# Patient Record
Sex: FEMALE | Race: WHITE | NOT HISPANIC OR LATINO | URBAN - METROPOLITAN AREA
[De-identification: names, ages, dates, MRNs, and addresses within clinical notes are randomized per-mention and may not be internally consistent; named-entity substitution may affect disease eponyms.]

---

## 2021-12-30 LAB
EXTERNAL CHLAMYDIA RESULT: NOT DETECTED
N GONORRHOEA RRNA SPEC QL PROBE: NOT DETECTED

## 2023-01-04 ENCOUNTER — APPOINTMENT (OUTPATIENT)
Dept: LAB | Facility: MEDICAL CENTER | Age: 33
End: 2023-01-04

## 2023-03-08 ENCOUNTER — TELEPHONE (OUTPATIENT)
Dept: ADMINISTRATIVE | Facility: OTHER | Age: 33
End: 2023-03-08

## 2023-03-08 RX ORDER — NORETHINDRONE ACETATE AND ETHINYL ESTRADIOL 1MG-20(21)
1 KIT ORAL DAILY
COMMUNITY
End: 2023-03-09

## 2023-03-08 NOTE — TELEPHONE ENCOUNTER
----- Message from [facility] sent at 3/8/2023  9:55 AM EST -----  Regarding: pap  11/19/20 9:19 AM    Hello, our patient attached above has had Pap Smear (HPV) aka Cervical Cancer Screening completed/performed  Please assist in updating the patient chart by pulling the Care Everywhere (CE) document  The date of service is 12/30/2021       Thank you,  Sherry Suarez  [facility] [id number] [place] [address]

## 2023-03-09 ENCOUNTER — OFFICE VISIT (OUTPATIENT)
Dept: FAMILY MEDICINE CLINIC | Facility: CLINIC | Age: 33
End: 2023-03-09

## 2023-03-09 VITALS
RESPIRATION RATE: 16 BRPM | HEART RATE: 89 BPM | HEIGHT: 64 IN | OXYGEN SATURATION: 98 % | DIASTOLIC BLOOD PRESSURE: 70 MMHG | TEMPERATURE: 98.3 F | SYSTOLIC BLOOD PRESSURE: 100 MMHG | WEIGHT: 129 LBS | BODY MASS INDEX: 22.02 KG/M2

## 2023-03-09 DIAGNOSIS — Z86.2 HISTORY OF NEUTROPENIA: ICD-10-CM

## 2023-03-09 DIAGNOSIS — Z13.6 SCREENING FOR CARDIOVASCULAR CONDITION: ICD-10-CM

## 2023-03-09 DIAGNOSIS — Z00.00 ENCOUNTER FOR ANNUAL PHYSICAL EXAM: Primary | ICD-10-CM

## 2023-03-09 NOTE — PROGRESS NOTES
[address]    NAME: Nat Barnhart  AGE: 28 y o  SEX: female  : 1990     DATE: 3/9/2023     Assessment and Plan:     Problem List Items Addressed This Visit    None  Visit Diagnoses     Encounter for annual physical exam    -  Primary    Healthy F here as new patient  No acute concerns  UTD for pap  Tdap recieved  Will upload record through [employer] and consumes healthy diet  FBW ordered    Screening for cardiovascular condition        FBW ordered     Relevant Orders    Comprehensive metabolic panel    Lipid panel    History of neutropenia        Reports low white counts in the past and has not seen hematology  Will check and depending on the results, refer to heme     Relevant Orders    CBC and differential          Immunizations and preventive care screenings were discussed with patient today  Appropriate education was printed on patient's after visit summary  Counseling:  Dental Health: discussed importance of regular tooth brushing, flossing, and dental visits  Exercise: the importance of regular exercise/physical activity was discussed  Recommend exercise 3-5 times per week for at least 30 minutes  No follow-ups on file  Chief Complaint:     Chief Complaint   Patient presents with   • Annual Exam     No concerns      History of Present Illness:     Adult Annual Physical   Patient here as a new patient for a comprehensive physical exam  The patient reports no problems  Last PCP Dr Corrine Buenrostro in [place]  Moved back to [place] a few months ago and works per [employer] for [employer]  Last pap smear was in  and again last week with an [facility]  UTD for Tdap    Diet and Physical Activity  Diet/Nutrition: well balanced diet  Exercise: weight training and cardio         Depression Screening  PHQ-2/9 Depression Screening    Little interest or pleasure in doing things: 0 - not at all  Feeling down, depressed, or hopeless: 0 - not at all  PHQ-2 Score: 0  PHQ-2 Interpretation: Negative depression screen       General Health  Sleep: gets 7-8 hours of sleep on average  Hearing: normal - bilateral   Vision: no vision problems  Dental: last appt was Dec 2021 and has [facility] appt   /GYN Health  Last menstrual period: 2/25  Contraceptive method: was on OCP   History of STDs?: no      Review of Systems:     Review of Systems   Past Medical History:     History reviewed  No pertinent past medical history  Past Surgical History:     History reviewed  No pertinent surgical history  Social History:     Social History     Socioeconomic History   • Marital status: /Civil Union     Spouse name: Le Smith   • Number of children: 0   • Years of education: None   • Highest education level: Doctorate   Occupational History   • Occupation: DPT for [employer]   Tobacco Use   • Smoking status: Never     Passive exposure: Never   • Smokeless tobacco: Never   Vaping Use   • Vaping Use: Never used   Substance and Sexual Activity   • Alcohol use:  Yes     Alcohol/week: 5 0 standard drinks     Types: 3 Glasses of wine, 2 Cans of beer per week     Comment: socially   • Drug use: Never   • Sexual activity: Yes     Partners: Male     Birth control/protection: Condom Male   Other Topics Concern   • None   Social History Narrative   • None     Social Determinants of Health     Financial Resource Strain: Not on file   Food Insecurity: Not on file   Transportation Needs: Not on file   Physical Activity: Not on file   Stress: Not on file   Social Connections: Not on file   Intimate Partner Violence: Not on file   Housing Stability: Not on file      Family History:     Family History   Problem Relation Age of Onset   • Thyroid disease Mother         hypothyroidism   • Anxiety disorder Father    • No Known Problems Brother    • No Known Problems Brother    • Dementia Maternal Grandmother    • Heart disease Maternal Grandfather    • Diabetes Maternal Grandfather       Current Medications:     No current outpatient medications on file  No current facility-administered medications for this visit  Allergies:     No Known Allergies   Physical Exam:     /70 (BP Location: Left arm, Patient Position: Sitting, Cuff Size: Standard)   Pulse 89   Temp 98 3 °F (36 8 °C) (Tympanic)   Resp 16   Ht 5' 4" (1 626 m)   Wt 58 5 kg (129 lb)   SpO2 98%   BMI 22 14 kg/m²     Physical Exam  Vitals reviewed  Constitutional:       General: She is not in acute distress  Appearance: Normal appearance  She is not toxic-appearing  HENT:      Head: Normocephalic and atraumatic  Right Ear: Tympanic membrane normal       Left Ear: Tympanic membrane normal       Nose: No congestion  Mouth/Throat:      Mouth: Mucous membranes are moist    Eyes:      Extraocular Movements: Extraocular movements intact  Cardiovascular:      Rate and Rhythm: Normal rate and regular rhythm  Heart sounds: No murmur heard  Pulmonary:      Effort: Pulmonary effort is normal  No respiratory distress  Breath sounds: Normal breath sounds  No stridor  No wheezing or rales  Musculoskeletal:      Right lower leg: No edema  Left lower leg: No edema  Lymphadenopathy:      Cervical: No cervical adenopathy  Skin:     General: Skin is warm  Neurological:      Mental Status: She is alert and oriented to person, place, and time     Psychiatric:         Mood and Affect: Mood normal          Behavior: Behavior normal           Nsesa Peter MD   [address] [facility]

## 2023-03-10 ENCOUNTER — TELEPHONE (OUTPATIENT)
Dept: ADMINISTRATIVE | Facility: OTHER | Age: 33
End: 2023-03-10

## 2023-03-10 NOTE — TELEPHONE ENCOUNTER
Upon review of the In Basket request we were able to locate, review, and update the patient chart as requested for Pap Smear (HPV) aka Cervical Cancer Screening  Any additional questions or concerns should be emailed to the Practice Liaisons via the appropriate education email address, please do not reply via In Basket      Thank you  Gabriela Bob MA

## 2023-03-10 NOTE — TELEPHONE ENCOUNTER
----- Message from Héctor Morgan MD sent at 3/9/2023  5:27 PM EST -----  Hello,    Patient had a pap smear with [facility] last week  Could we please update the care gap       Thank you

## 2023-03-12 NOTE — TELEPHONE ENCOUNTER
Upon review of the In Basket request we were able to locate, review, and update the patient chart as requested for Pap Smear (HPV) aka Cervical Cancer Screening  [id number], and 2019  Any additional questions or concerns should be emailed to the Practice Liaisons via the appropriate education email address, please do not reply via In Basket      Thank you  Sita Bryson

## 2023-03-22 LAB
ALBUMIN SERPL-MCNC: 4.5 G/DL (ref 3.6–5.1)
ALBUMIN/GLOB SERPL: 1.8 (CALC) (ref 1–2.5)
ALP SERPL-CCNC: 30 U/L (ref 31–125)
ALT SERPL-CCNC: 11 U/L (ref 6–29)
AST SERPL-CCNC: 17 U/L (ref 10–30)
BASOPHILS # BLD AUTO: 28 CELLS/UL (ref 0–200)
BASOPHILS NFR BLD AUTO: 0.7 %
BILIRUB SERPL-MCNC: 0.6 MG/DL (ref 0.2–1.2)
BUN SERPL-MCNC: 15 MG/DL (ref 7–25)
BUN/CREAT SERPL: ABNORMAL (CALC) (ref 6–22)
CALCIUM SERPL-MCNC: 9.1 MG/DL (ref 8.6–10.2)
CHLORIDE SERPL-SCNC: 105 MMOL/L (ref 98–110)
CHOLEST SERPL-MCNC: 174 MG/DL
CHOLEST/HDLC SERPL: 2.7 (CALC)
CO2 SERPL-SCNC: 26 MMOL/L (ref 20–32)
CREAT SERPL-MCNC: 0.7 MG/DL (ref 0.5–0.97)
EOSINOPHIL # BLD AUTO: 120 CELLS/UL (ref 15–500)
EOSINOPHIL NFR BLD AUTO: 3 %
ERYTHROCYTE [DISTWIDTH] IN BLOOD BY AUTOMATED COUNT: 11.9 % (ref 11–15)
GFR/BSA.PRED SERPLBLD CYS-BASED-ARV: 118 ML/MIN/1.73M2
GLOBULIN SER CALC-MCNC: 2.5 G/DL (CALC) (ref 1.9–3.7)
GLUCOSE SERPL-MCNC: 90 MG/DL (ref 65–99)
HCT VFR BLD AUTO: 38.4 % (ref 35–45)
HDLC SERPL-MCNC: 64 MG/DL
HGB BLD-MCNC: 12.9 G/DL (ref 11.7–15.5)
LDLC SERPL CALC-MCNC: 93 MG/DL (CALC)
LYMPHOCYTES # BLD AUTO: 1652 CELLS/UL (ref 850–3900)
LYMPHOCYTES NFR BLD AUTO: 41.3 %
MCH RBC QN AUTO: 30.4 PG (ref 27–33)
MCHC RBC AUTO-ENTMCNC: 33.6 G/DL (ref 32–36)
MCV RBC AUTO: 90.4 FL (ref 80–100)
MONOCYTES # BLD AUTO: 256 CELLS/UL (ref 200–950)
MONOCYTES NFR BLD AUTO: 6.4 %
NEUTROPHILS # BLD AUTO: 1944 CELLS/UL (ref 1500–7800)
NEUTROPHILS NFR BLD AUTO: 48.6 %
NONHDLC SERPL-MCNC: 110 MG/DL (CALC)
PLATELET # BLD AUTO: 240 THOUSAND/UL (ref 140–400)
PMV BLD REES-ECKER: 11.3 FL (ref 7.5–12.5)
POTASSIUM SERPL-SCNC: 4.5 MMOL/L (ref 3.5–5.3)
PROT SERPL-MCNC: 7 G/DL (ref 6.1–8.1)
RBC # BLD AUTO: 4.25 MILLION/UL (ref 3.8–5.1)
SODIUM SERPL-SCNC: 137 MMOL/L (ref 135–146)
TRIGL SERPL-MCNC: 79 MG/DL
WBC # BLD AUTO: 4 THOUSAND/UL (ref 3.8–10.8)

## 2023-12-17 DIAGNOSIS — Z00.6 ENCOUNTER FOR EXAMINATION FOR NORMAL COMPARISON OR CONTROL IN CLINICAL RESEARCH PROGRAM: ICD-10-CM

## 2024-02-01 ENCOUNTER — TELEPHONE (OUTPATIENT)
Dept: OBGYN CLINIC | Facility: CLINIC | Age: 34
End: 2024-02-01

## 2024-02-01 NOTE — TELEPHONE ENCOUNTER
Attempted to call, left a message asking for patient to call the office. Office number provided in message. Patient has an annual scheduled on 02/26 - patient had an annual in march of 2023 with [facility].     When patient calls back, please inquire if she would like to be seen for something else, or if she would like to reschedule her annual

## 2024-02-06 ENCOUNTER — OFFICE VISIT (OUTPATIENT)
Dept: OBGYN CLINIC | Facility: CLINIC | Age: 34
End: 2024-02-06

## 2024-02-06 VITALS
RESPIRATION RATE: 18 BRPM | DIASTOLIC BLOOD PRESSURE: 88 MMHG | SYSTOLIC BLOOD PRESSURE: 124 MMHG | HEART RATE: 91 BPM | WEIGHT: 130 LBS | HEIGHT: 64 IN | BODY MASS INDEX: 22.2 KG/M2

## 2024-02-06 DIAGNOSIS — Z01.419 ENCOUNTER FOR GYNECOLOGICAL EXAMINATION WITHOUT ABNORMAL FINDING: Primary | ICD-10-CM

## 2024-02-06 LAB
EXTERNAL CHLAMYDIA RESULT: NOT DETECTED
EXTERNAL HIV SCREEN: NORMAL
HCV AB SER-ACNC: NORMAL
N GONORRHOEA RRNA SPEC QL PROBE: NOT DETECTED

## 2024-02-06 PROCEDURE — S0610 ANNUAL GYNECOLOGICAL EXAMINA: HCPCS | Performed by: NURSE PRACTITIONER

## 2024-02-06 NOTE — PROGRESS NOTES
ASSESSMENT & PLAN: Calli Blair is a 33 y.o. No obstetric history on file. with normal gynecologic exam.    1.  Routine well woman exam done today  2.  Pap and HPV:  The patient's last pap and hpv was 3/2/23.    It was normal.  She denies abnormal pap hx.   Pap and cotesting was not done today.    Current ASCCP Guidelines reviewed.   3.  The following were reviewed in today's visit: breast self exam, mammography screening ordered, STD testing, adequate intake of calcium and vitamin D, exercise, and healthy diet.  4. Gardisil vaccine in women up to age 45 - she was vaccinated  5. Desires a pregnancy, has appt with infertility today.   Taking daily prenatal vitamin     Depression Screening Follow-up Plan: Patient's depression screening was negative  with a PHQ-2 score of 1 . Their PHQ-9 score was 2. Clinically patient does not have depression. No treatment is required.  BMI Counseling: Body mass index is 22.31 kg/m². The BMI is normal .     CC:  Annual Gynecologic Examination    HPI: Calli Blair is a 33 y.o. No obstetric history on file. who presents for annual gynecologic examination.  Has infertility appt this afternoon, has been attempting pregnancy x 4 months. Using ovulation tests that have been positive.  Gets PMS symptoms. Seeing a therapist for anxiety.  Health Maintenance:    She wears her seatbelt routinely.    She does perform regular monthly self breast exams.    She feels safe at home.     History reviewed. No pertinent past medical history.    History reviewed. No pertinent surgical history.    Past OB/Gyn History:  OB History    No obstetric history on file.       Pt does not have menstrual issues. Monthly x 5 days   History of sexually transmitted infection: No. Heavy 1st day, changes every 4 hours when heavy  History of abnormal pap smears: No .    Patient is currently sexually active.  heterosexual.  The current method of family planning is none.    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[employer]   Tobacco Use    Smoking status: Never     Passive exposure: Never    Smokeless tobacco: Never   Vaping Use    Vaping status: Never Used   Substance and Sexual Activity    Alcohol use: Yes     Alcohol/week: 4.0 standard drinks of alcohol     Types: 2 Glasses of wine, 2 Cans of beer per week     Comment: socially    Drug use: Never    Sexual activity: Yes     Partners: Male     Birth control/protection: None   Other Topics Concern    Not on file   Social History Narrative    Not on file     Social Determinants of Health     Financial Resource Strain: Low Risk  (2/5/2024)    Overall Financial Resource Strain (CARDIA)     Difficulty of Paying Living Expenses: Not hard at all   Food Insecurity: No Food Insecurity (2/5/2024)    Hunger Vital Sign     Worried About Running Out of Food in the Last Year: Never true     Ran Out of Food in the Last Year: Never true   Transportation Needs: No Transportation Needs (2/5/2024)    PRAPARE - Transportation     Lack of Transportation (Medical): No     Lack of Transportation (Non-Medical): No   Physical Activity: Not on file   Stress: Not on file   Social Connections: Not on file   Intimate Partner Violence: Not on file   Housing Stability: Low Risk  (2/6/2024)    Housing Stability Vital Sign     Unable to Pay for Housing in the Last Year: No     Number of Places Lived in the Last Year: 1     Unstable Housing in the Last Year: No     Presently lives with .   "Patient is .  Patient is currently employed  Physical therapist at [employer]    No Known Allergies    No current outpatient medications on file.      Review of Systems  Constitutional :no fever, feels well, no tiredness, no recent weight gain or loss  ENT: no ear ache, no loss of hearing, no nosebleeds or nasal discharge, no sore throat or hoarseness.  Cardiovascular: no complaints of slow or fast heart beat, no chest pain, no palpitations, no leg claudication or lower extremity edema.  Respiratory: no complaints of shortness of shortness of breath, no WESLEY  Breasts:no complaints of breast pain, breast lump, or nipple discharge  Gastrointestinal: no complaints of abdominal pain, constipation, nausea, vomiting, or diarrhea or bloody stools  Genitourinary : no complaints of dysuria, incontinence, pelvic pain, no dysmenorrhea, vaginal discharge or abnormal vaginal bleeding and as noted in HPI.  Musculoskeletal: no complaints of arthralgia, no myalgia, no joint swelling or stiffness, no limb pain or swelling.  Integumentary: no complaints of skin rash or lesion, itching or dry skin  Neurological: no complaints of headache, no confusion, no numbness or tingling, no dizziness or fainting    Objective      /88 (BP Location: Right arm, Patient Position: Sitting, Cuff Size: Adult)   Pulse 91   Resp 18   Ht 5' 4\" (1.626 m)   Wt 59 kg (130 lb)   LMP 01/18/2024 (Exact Date)   BMI 22.31 kg/m²   General:   appears stated age, cooperative, alert normal mood and affect   Neck: normal, supple,trachea midline, no masses   Heart: regular rate and rhythm, S1, S2 normal, no murmur, click, rub or gallop   Lungs: clear to auscultation bilaterally   Breasts: normal appearance, no masses or tenderness, Inspection negative, No nipple retraction or dimpling, No nipple discharge or bleeding, No axillary or supraclavicular adenopathy, Normal to palpation without dominant masses, Taught monthly breast self examination   Abdomen: " soft, non-tender, without masses or organomegaly   Vulva: normal female genitalia, Bartholin's, Urethra, Gage normal   Vagina: normal vagina, no discharge, exudate, lesion, or erythema   Urethra: normal   Cervix: Normal, no discharge. Nontender.   Uterus: normal size, contour, position, consistency, mobility, non-tender and mobile   Adnexa: normal adnexa and no mass, fullness, tenderness   Lymphatic palpation of lymph nodes in neck, axilla, groin and/or other locations: no lymphadenopathy or masses noted   Skin normal skin turgor and no rashes.   Psychiatric orientation to person, place, and time: normal. mood and affect: normal

## 2024-02-21 PROBLEM — Z01.419 ENCOUNTER FOR GYNECOLOGICAL EXAMINATION WITHOUT ABNORMAL FINDING: Status: RESOLVED | Noted: 2024-02-06 | Resolved: 2024-02-21

## 2024-03-04 ENCOUNTER — TELEPHONE (OUTPATIENT)
Age: 34
End: 2024-03-04

## 2024-03-04 NOTE — TELEPHONE ENCOUNTER
Received call from patient asking if there were any new patient opening's at [facility].    No new patient appt were available at the time of this call    Patient has already been wait/cancellation listed in Jan.

## 2024-03-15 ENCOUNTER — OFFICE VISIT (OUTPATIENT)
Dept: FAMILY MEDICINE CLINIC | Facility: CLINIC | Age: 34
End: 2024-03-15
Payer: COMMERCIAL

## 2024-03-15 VITALS
WEIGHT: 129.2 LBS | TEMPERATURE: 97.8 F | OXYGEN SATURATION: 99 % | DIASTOLIC BLOOD PRESSURE: 60 MMHG | HEART RATE: 97 BPM | HEIGHT: 64 IN | RESPIRATION RATE: 16 BRPM | SYSTOLIC BLOOD PRESSURE: 110 MMHG | BODY MASS INDEX: 22.06 KG/M2

## 2024-03-15 DIAGNOSIS — F41.1 GAD (GENERALIZED ANXIETY DISORDER): ICD-10-CM

## 2024-03-15 DIAGNOSIS — Z00.00 ENCOUNTER FOR ANNUAL PHYSICAL EXAM: Primary | ICD-10-CM

## 2024-03-15 PROCEDURE — 99395 PREV VISIT EST AGE 18-39: CPT | Performed by: FAMILY MEDICINE

## 2024-03-15 RX ORDER — SERTRALINE HYDROCHLORIDE 25 MG/1
25 TABLET, FILM COATED ORAL DAILY
Qty: 30 TABLET | Refills: 0 | Status: SHIPPED | OUTPATIENT
Start: 2024-03-15 | End: 2024-09-11

## 2024-03-15 NOTE — PROGRESS NOTES
ADULT ANNUAL PHYSICAL  [facility]    NAME: Calli Blair  AGE: 33 y.o. SEX: female  : 1990     DATE: 3/15/2024     Assessment and Plan:     Problem List Items Addressed This Visit    None  Visit Diagnoses       Encounter for annual physical exam    -  Primary    Unremarkable exam. UTD for pap.    YOSEF (generalized anxiety disorder)        Chronic and dates back to chidlhood. Sees a therapist but has not tried medications. Interested in starting meds. Scored 10 on YOSEF 7. Start zoloft 25 mg daily. May increase to 50 mg in 2 weeks for partial response. Aware of s/e. RTC 6 wks    Relevant Medications    sertraline (ZOLOFT) 25 mg tablet            Immunizations and preventive care screenings were discussed with patient today. Appropriate education was printed on patient's after visit summary.    Counseling:  Exercise: the importance of regular exercise/physical activity was discussed. Recommend exercise 3-5 times per week for at least 30 minutes.          No follow-ups on file.     Chief Complaint:     Chief Complaint   Patient presents with   • Physical Exam      History of Present Illness:     Adult Annual Physical   Patient here for a comprehensive physical exam. The patient reports problems - anxiety. She has dealt with for years. Sees a therapist regularly. Has not been on medication. FH of mental health disease. Exercises regularly, mediates, and does yoga  .    Diet and Physical Activity  Diet/Nutrition: well balanced diet.   Exercise:  exercising regularly  .      Depression Screening  PHQ-2/9 Depression Screening    Little interest or pleasure in doing things: 0 - not at all  Feeling down, depressed, or hopeless: 0 - not at all  PHQ-2 Score: 0  PHQ-2 Interpretation: Negative depression screen       General Health  Sleep: sleeps well.   Hearing: normal - bilateral.  Vision: no vision problems.   Dental: regular dental visits.       /GYN  Health  Follows with gynecology? yes   Last menstrual period: Feb 16th  Contraceptive method:  none .  History of STDs?: no.       Review of Systems:     Review of Systems   Past Medical History:     History reviewed. No pertinent past medical history.   Past Surgical History:     History reviewed. No pertinent surgical history.   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[employer]   Tobacco Use   • Smoking status: Never     Passive exposure: Never   • Smokeless tobacco: Never   Vaping Use   • Vaping status: Never Used   Substance and Sexual Activity   • Alcohol use: Yes     Alcohol/week: 4.0 standard drinks of alcohol     Types: 2 Glasses of wine, 2 Cans of beer per week     Comment: socially   • Drug use: Never   • Sexual activity: Yes     Partners: Male     Birth control/protection: None   Other Topics Concern   • None   Social History Narrative   • None     Social Determinants of Health     Financial Resource Strain: Low Risk  (2/5/2024)    Overall Financial Resource Strain (CARDIA)    • Difficulty of Paying Living Expenses: Not hard at all   Food Insecurity: No Food Insecurity (2/5/2024)    Hunger Vital Sign    • Worried About Running Out of Food in the Last Year: Never true    • Ran Out of Food in the Last Year: Never true   Transportation Needs: No Transportation Needs (2/5/2024)    PRAPARE - Transportation    • Lack of Transportation (Medical): No    • Lack of Transportation (Non-Medical): No   Physical Activity: Not on file   Stress: Not on file   Social Connections: Not on file   Intimate Partner Violence: Not on file   Housing Stability: Low Risk  (2/6/2024)    Housing Stability Vital Sign    • Unable to Pay for Housing in the Last Year: No    • Number of Places Lived in the Last Year: 1  "   • Unstable Housing in the Last Year: No      Family History:     Family History   Problem Relation Age of Onset   • Thyroid disease Mother         hypothyroidism   • Anxiety disorder Father    • No Known Problems Brother    • No Known Problems Brother    • Dementia Maternal Grandmother    • Heart disease Maternal Grandfather    • Diabetes Maternal Grandfather       Current Medications:     Current Outpatient Medications   Medication Sig Dispense Refill   • sertraline (ZOLOFT) 25 mg tablet Take 1 tablet (25 mg total) by mouth daily 30 tablet 0     No current facility-administered medications for this visit.      Allergies:     No Known Allergies   Physical Exam:     /60 (BP Location: Left arm, Patient Position: Sitting, Cuff Size: Adult)   Pulse 97   Temp 97.8 °F (36.6 °C) (Tympanic)   Resp 16   Ht 5' 4\" (1.626 m)   Wt 58.6 kg (129 lb 3.2 oz)   SpO2 99%   BMI 22.18 kg/m²     Physical Exam  Constitutional:       General: She is not in acute distress.     Appearance: Normal appearance. She is not ill-appearing or toxic-appearing.   HENT:      Head: Normocephalic and atraumatic.      Right Ear: Tympanic membrane normal.      Left Ear: Tympanic membrane normal.      Mouth/Throat:      Mouth: Mucous membranes are moist.   Eyes:      Extraocular Movements: Extraocular movements intact.   Cardiovascular:      Rate and Rhythm: Normal rate and regular rhythm.      Heart sounds: No murmur heard.  Pulmonary:      Effort: Pulmonary effort is normal. No respiratory distress.      Breath sounds: Normal breath sounds. No stridor. No wheezing or rales.   Abdominal:      General: There is no distension.      Palpations: Abdomen is soft. There is no mass.      Tenderness: There is no abdominal tenderness. There is no guarding or rebound.      Hernia: No hernia is present.   Musculoskeletal:      Right lower leg: No edema.      Left lower leg: No edema.   Lymphadenopathy:      Cervical: No cervical adenopathy.   Skin:   "   General: Skin is warm.   Neurological:      Mental Status: She is alert and oriented to person, place, and time.   Psychiatric:         Behavior: Behavior normal.        Eric Olmedo MD   [facility], [place], operated [facility]

## 2024-04-06 DIAGNOSIS — F41.1 GAD (GENERALIZED ANXIETY DISORDER): ICD-10-CM

## 2024-04-07 RX ORDER — SERTRALINE HYDROCHLORIDE 25 MG/1
25 TABLET, FILM COATED ORAL DAILY
Qty: 30 TABLET | Refills: 5 | Status: SHIPPED | OUTPATIENT
Start: 2024-04-07 | End: 2024-10-04

## 2024-04-10 ENCOUNTER — PATIENT MESSAGE (OUTPATIENT)
Dept: FAMILY MEDICINE CLINIC | Facility: CLINIC | Age: 34
End: 2024-04-10

## 2024-04-10 DIAGNOSIS — F41.1 GAD (GENERALIZED ANXIETY DISORDER): ICD-10-CM

## 2024-04-29 DIAGNOSIS — F41.1 GAD (GENERALIZED ANXIETY DISORDER): ICD-10-CM

## 2024-04-29 RX ORDER — SERTRALINE HYDROCHLORIDE 25 MG/1
25 TABLET, FILM COATED ORAL DAILY
Qty: 30 TABLET | Refills: 5 | Status: CANCELLED | OUTPATIENT
Start: 2024-04-29 | End: 2024-10-26

## 2024-04-29 NOTE — PATIENT COMMUNICATION
Patient called the RX Refill Line. Message is being forwarded to the office.     Patient is requesting a new script for the adjusted dosage for her sertraline be sent into [facility]. She is low/out of this medication at this time but needs the script to be resent with the adjusted 50mg dosage to be able to pick it up at the pharmacy. Please review and advise.    Please contact patient at  with any questions.

## 2024-04-29 NOTE — TELEPHONE ENCOUNTER
need to refill my prescription, but my next refill from [facility] isn’t available until May 6 (because the script is for 1 tablet of 25mg, but we since upped it to 2 tablets).   Are you able to send updated script to my pharmacy?     [address])  [phone]

## 2024-04-30 NOTE — TELEPHONE ENCOUNTER
Refill must be reviewed and completed by the office or provider. The refill is unable to be approved or denied by the medication management team.    Message below is requesting for directions to be changed from one tablet to 2 tablets and there is no indication of the medication to be changed. Please contact patient to clarify and order if appropriate

## 2024-06-05 ENCOUNTER — PATIENT MESSAGE (OUTPATIENT)
Dept: FAMILY MEDICINE CLINIC | Facility: CLINIC | Age: 34
End: 2024-06-05

## 2024-06-05 DIAGNOSIS — F41.1 GAD (GENERALIZED ANXIETY DISORDER): ICD-10-CM

## 2024-06-05 DIAGNOSIS — Z86.2 HISTORY OF NEUTROPENIA: Primary | ICD-10-CM

## 2024-06-05 DIAGNOSIS — Z00.00 ENCOUNTER FOR SCREENING AND PREVENTATIVE CARE: ICD-10-CM

## 2024-06-12 ENCOUNTER — APPOINTMENT (OUTPATIENT)
Dept: LAB | Facility: CLINIC | Age: 34
End: 2024-06-12
Payer: COMMERCIAL

## 2024-06-12 DIAGNOSIS — Z00.00 ENCOUNTER FOR SCREENING AND PREVENTATIVE CARE: ICD-10-CM

## 2024-06-12 DIAGNOSIS — Z86.2 HISTORY OF NEUTROPENIA: ICD-10-CM

## 2024-06-12 DIAGNOSIS — F41.1 GAD (GENERALIZED ANXIETY DISORDER): ICD-10-CM

## 2024-06-12 DIAGNOSIS — Z00.6 ENCOUNTER FOR EXAMINATION FOR NORMAL COMPARISON OR CONTROL IN CLINICAL RESEARCH PROGRAM: ICD-10-CM

## 2024-06-12 DIAGNOSIS — Z00.8 HEALTH EXAMINATION IN POPULATION SURVEY: ICD-10-CM

## 2024-06-12 LAB
ALBUMIN SERPL BCP-MCNC: 4.3 G/DL (ref 3.5–5)
ALP SERPL-CCNC: 28 U/L (ref 34–104)
ALT SERPL W P-5'-P-CCNC: 12 U/L (ref 7–52)
ANION GAP SERPL CALCULATED.3IONS-SCNC: 9 MMOL/L (ref 4–13)
AST SERPL W P-5'-P-CCNC: 17 U/L (ref 13–39)
BASOPHILS # BLD AUTO: 0.01 THOUSANDS/ÂΜL (ref 0–0.1)
BASOPHILS NFR BLD AUTO: 0 % (ref 0–1)
BILIRUB SERPL-MCNC: 0.36 MG/DL (ref 0.2–1)
BUN SERPL-MCNC: 12 MG/DL (ref 5–25)
CALCIUM SERPL-MCNC: 8.6 MG/DL (ref 8.4–10.2)
CHLORIDE SERPL-SCNC: 105 MMOL/L (ref 96–108)
CHOLEST SERPL-MCNC: 168 MG/DL
CO2 SERPL-SCNC: 27 MMOL/L (ref 21–32)
CREAT SERPL-MCNC: 0.69 MG/DL (ref 0.6–1.3)
EOSINOPHIL # BLD AUTO: 0.19 THOUSAND/ÂΜL (ref 0–0.61)
EOSINOPHIL NFR BLD AUTO: 4 % (ref 0–6)
ERYTHROCYTE [DISTWIDTH] IN BLOOD BY AUTOMATED COUNT: 13.6 % (ref 11.6–15.1)
EST. AVERAGE GLUCOSE BLD GHB EST-MCNC: 105 MG/DL
GFR SERPL CREATININE-BSD FRML MDRD: 114 ML/MIN/1.73SQ M
GLUCOSE P FAST SERPL-MCNC: 94 MG/DL (ref 65–99)
HBA1C MFR BLD: 5.3 %
HCT VFR BLD AUTO: 39.5 % (ref 34.8–46.1)
HDLC SERPL-MCNC: 63 MG/DL
HGB BLD-MCNC: 12.7 G/DL (ref 11.5–15.4)
IMM GRANULOCYTES # BLD AUTO: 0.01 THOUSAND/UL (ref 0–0.2)
IMM GRANULOCYTES NFR BLD AUTO: 0 % (ref 0–2)
LDLC SERPL CALC-MCNC: 82 MG/DL (ref 0–100)
LYMPHOCYTES # BLD AUTO: 1.49 THOUSANDS/ÂΜL (ref 0.6–4.47)
LYMPHOCYTES NFR BLD AUTO: 35 % (ref 14–44)
MCH RBC QN AUTO: 30.7 PG (ref 26.8–34.3)
MCHC RBC AUTO-ENTMCNC: 32.2 G/DL (ref 31.4–37.4)
MCV RBC AUTO: 95 FL (ref 82–98)
MONOCYTES # BLD AUTO: 0.34 THOUSAND/ÂΜL (ref 0.17–1.22)
MONOCYTES NFR BLD AUTO: 8 % (ref 4–12)
NEUTROPHILS # BLD AUTO: 2.26 THOUSANDS/ÂΜL (ref 1.85–7.62)
NEUTS SEG NFR BLD AUTO: 53 % (ref 43–75)
NONHDLC SERPL-MCNC: 105 MG/DL
NRBC BLD AUTO-RTO: 0 /100 WBCS
PLATELET # BLD AUTO: 210 THOUSANDS/UL (ref 149–390)
PMV BLD AUTO: 11.8 FL (ref 8.9–12.7)
POTASSIUM SERPL-SCNC: 4 MMOL/L (ref 3.5–5.3)
PROT SERPL-MCNC: 6.6 G/DL (ref 6.4–8.4)
RBC # BLD AUTO: 4.14 MILLION/UL (ref 3.81–5.12)
SODIUM SERPL-SCNC: 141 MMOL/L (ref 135–147)
TRIGL SERPL-MCNC: 115 MG/DL
WBC # BLD AUTO: 4.3 THOUSAND/UL (ref 4.31–10.16)

## 2024-06-12 PROCEDURE — 83036 HEMOGLOBIN GLYCOSYLATED A1C: CPT

## 2024-06-12 PROCEDURE — 80061 LIPID PANEL: CPT

## 2024-06-12 PROCEDURE — 36415 COLL VENOUS BLD VENIPUNCTURE: CPT

## 2024-06-12 PROCEDURE — 85025 COMPLETE CBC W/AUTO DIFF WBC: CPT

## 2024-06-12 PROCEDURE — 80053 COMPREHEN METABOLIC PANEL: CPT

## 2024-09-11 ENCOUNTER — ULTRASOUND (OUTPATIENT)
Dept: OBGYN CLINIC | Facility: CLINIC | Age: 34
End: 2024-09-11
Payer: COMMERCIAL

## 2024-09-11 VITALS
DIASTOLIC BLOOD PRESSURE: 80 MMHG | WEIGHT: 125 LBS | HEIGHT: 64 IN | SYSTOLIC BLOOD PRESSURE: 102 MMHG | BODY MASS INDEX: 21.34 KG/M2

## 2024-09-11 DIAGNOSIS — N91.2 AMENORRHEA: Primary | ICD-10-CM

## 2024-09-11 PROCEDURE — 76817 TRANSVAGINAL US OBSTETRIC: CPT | Performed by: PHYSICIAN ASSISTANT

## 2024-09-11 PROCEDURE — 99214 OFFICE O/P EST MOD 30 MIN: CPT | Performed by: PHYSICIAN ASSISTANT

## 2024-09-11 RX ORDER — PROGESTERONE 200 MG/1
CAPSULE ORAL
COMMUNITY
Start: 2024-08-30

## 2024-09-11 RX ORDER — LEVOTHYROXINE SODIUM 75 UG/1
TABLET ORAL
COMMUNITY
Start: 2024-08-12

## 2024-09-11 RX ORDER — GLUCOSAMINE HCL 500 MG
TABLET ORAL
COMMUNITY

## 2024-09-11 NOTE — PROGRESS NOTES
"Assessment/Plan:  - Viable IUP @ 8w 5d EGA  - MIGUEL 2025  - Continue PNV  - Patient to call for concerns  - RTO 3 weeks for OB intake    Encounter Diagnosis     ICD-10-CM    1. Amenorrhea  N91.2 Ambulatory Referral to Maternal Fetal Medicine              Subjective:       Patient ID: Calli Blair 1990        Calli Blair is a 34 y.o.  presenting to the office for pregnancy confirmation. Patient's last menstrual period was 2024 (exact date).      This is an IVF pregnancy with FET completed on 24 with an EDC as 25.      OB History    Para Term  AB Living   1             SAB IAB Ectopic Multiple Live Births                  # Outcome Date GA Lbr Jon/2nd Weight Sex Type Anes PTL Lv   1 Current               Obstetric Comments   Menarche 12          The following portions of the patient's history were reviewed and updated as appropriate: allergies, current medications, past family history, past medical history, past social history, past surgical history, and problem list.    Allergies:  Patient has no known allergies.    Medications:    Current Outpatient Medications:     Cholecalciferol (Vitamin D3) 75 MCG (3000 UT) TABS, Take by mouth, Disp: , Rfl:     levothyroxine 75 mcg tablet, , Disp: , Rfl:     Prenatal MV-Min-Fe Fum-FA-DHA (PRENATAL 1 PO), Take by mouth, Disp: , Rfl:     Progesterone 200 MG CAPS, , Disp: , Rfl:     sertraline (ZOLOFT) 50 mg tablet, Take 1 tablet (50 mg total) by mouth daily, Disp: 90 tablet, Rfl: 1      Review of Systems:   Review of Systems   Gastrointestinal:  Negative for abdominal pain, nausea and vomiting.   Genitourinary:  Negative for vaginal bleeding.   Skin:  Negative for rash.          Objective:       Visit Vitals  /80 (BP Location: Left arm, Patient Position: Sitting, Cuff Size: Standard)   Ht 5' 4\" (1.626 m)   Wt 56.7 kg (125 lb)   LMP 2024 (Exact Date)   BMI 21.46 kg/m²   OB Status Pregnant   Smoking Status Never   BSA 1.6 " m²        GEN: The patient was alert and oriented x3, pleasant well-appearing female in no acute distress.   PULM: nonlabored respirations  MSK: Normal gait  : WNl  Skin: warm, dry  Neuro: no focal deficits  Psych: normal affect and judgement, cooperative    Ultrasound:     Viability US     Gestational sac: present               Location: intrauterine  Yolk sac: present  Fetal pole: present               CRL: 2.12 cm = 8w5d  Cardiac activity: present               Rate: 175 bpm     Ovaries: normal appearing bilaterally  Cul de sac: absence of free fluid  Uterus: normal in appearance           Ultrasound Probe Disinfection    A transvaginal ultrasound was performed.   Prior to use, disinfection was performed with High Level Disinfection Process (Trophon)  Probe serial number [id number]: [id number] was used    Miri Ty PA-C  09/11/24  8:55 AM    I have spent a total time of 30 minutes in caring for this patient on the day of the visit/encounter including Documenting in the medical record, Reviewing / ordering tests, medicine, procedures  , and Obtaining or reviewing history  .

## 2024-09-24 NOTE — PATIENT INSTRUCTIONS
Congratulations!! Please review our Pregnancy Essential Guide and [facility] L&D Virtual tour from our networks website.     [facility] Pregnancy Essentials Guide  [facility] ([email or web address])     [facility] ([email or web address])           Lesly STOVALL RN  OB Nurse Navigator

## 2024-09-27 ENCOUNTER — APPOINTMENT (OUTPATIENT)
Dept: LAB | Facility: AMBULARY SURGERY CENTER | Age: 34
End: 2024-09-27
Payer: COMMERCIAL

## 2024-09-27 ENCOUNTER — INITIAL PRENATAL (OUTPATIENT)
Dept: OBGYN CLINIC | Facility: CLINIC | Age: 34
End: 2024-09-27

## 2024-09-27 VITALS — WEIGHT: 124.8 LBS | HEIGHT: 64 IN | HEART RATE: 88 BPM | BODY MASS INDEX: 21.31 KG/M2

## 2024-09-27 DIAGNOSIS — Z3A.11 11 WEEKS GESTATION OF PREGNANCY: ICD-10-CM

## 2024-09-27 DIAGNOSIS — F41.1 GAD (GENERALIZED ANXIETY DISORDER): ICD-10-CM

## 2024-09-27 DIAGNOSIS — Z34.01 ENCOUNTER FOR SUPERVISION OF NORMAL FIRST PREGNANCY IN FIRST TRIMESTER: ICD-10-CM

## 2024-09-27 DIAGNOSIS — Z34.01 ENCOUNTER FOR SUPERVISION OF NORMAL FIRST PREGNANCY IN FIRST TRIMESTER: Primary | ICD-10-CM

## 2024-09-27 LAB
ABO GROUP BLD: NORMAL
BACTERIA UR QL AUTO: ABNORMAL /HPF
BASOPHILS # BLD AUTO: 0.01 THOUSANDS/ΜL (ref 0–0.1)
BASOPHILS NFR BLD AUTO: 0 % (ref 0–1)
BILIRUB UR QL STRIP: NEGATIVE
BLD GP AB SCN SERPL QL: NEGATIVE
CAOX CRY URNS QL MICRO: ABNORMAL /HPF
CLARITY UR: CLEAR
COLOR UR: YELLOW
EOSINOPHIL # BLD AUTO: 0.18 THOUSAND/ΜL (ref 0–0.61)
EOSINOPHIL NFR BLD AUTO: 3 % (ref 0–6)
ERYTHROCYTE [DISTWIDTH] IN BLOOD BY AUTOMATED COUNT: 13.5 % (ref 11.6–15.1)
GLUCOSE UR STRIP-MCNC: NEGATIVE MG/DL
HBV SURFACE AG SER QL: NORMAL
HCT VFR BLD AUTO: 39.2 % (ref 34.8–46.1)
HCV AB SER QL: NORMAL
HGB BLD-MCNC: 12.6 G/DL (ref 11.5–15.4)
HGB UR QL STRIP.AUTO: NEGATIVE
HIV 1+2 AB+HIV1 P24 AG SERPL QL IA: NORMAL
HIV 2 AB SERPL QL IA: NORMAL
HIV1 AB SERPL QL IA: NORMAL
HIV1 P24 AG SERPL QL IA: NORMAL
IMM GRANULOCYTES # BLD AUTO: 0.02 THOUSAND/UL (ref 0–0.2)
IMM GRANULOCYTES NFR BLD AUTO: 0 % (ref 0–2)
KETONES UR STRIP-MCNC: NEGATIVE MG/DL
LEUKOCYTE ESTERASE UR QL STRIP: NEGATIVE
LYMPHOCYTES # BLD AUTO: 1.88 THOUSANDS/ΜL (ref 0.6–4.47)
LYMPHOCYTES NFR BLD AUTO: 34 % (ref 14–44)
MCH RBC QN AUTO: 30 PG (ref 26.8–34.3)
MCHC RBC AUTO-ENTMCNC: 32.1 G/DL (ref 31.4–37.4)
MCV RBC AUTO: 93 FL (ref 82–98)
MONOCYTES # BLD AUTO: 0.27 THOUSAND/ΜL (ref 0.17–1.22)
MONOCYTES NFR BLD AUTO: 5 % (ref 4–12)
NEUTROPHILS # BLD AUTO: 3.19 THOUSANDS/ΜL (ref 1.85–7.62)
NEUTS SEG NFR BLD AUTO: 58 % (ref 43–75)
NITRITE UR QL STRIP: NEGATIVE
NON-SQ EPI CELLS URNS QL MICRO: ABNORMAL /HPF
NRBC BLD AUTO-RTO: 0 /100 WBCS
PH UR STRIP.AUTO: 5.5 [PH]
PLATELET # BLD AUTO: 220 THOUSANDS/UL (ref 149–390)
PMV BLD AUTO: 11.3 FL (ref 8.9–12.7)
PROT UR STRIP-MCNC: NEGATIVE MG/DL
RBC # BLD AUTO: 4.2 MILLION/UL (ref 3.81–5.12)
RBC #/AREA URNS AUTO: ABNORMAL /HPF
RH BLD: POSITIVE
RUBV IGG SERPL IA-ACNC: 34 IU/ML
SP GR UR STRIP.AUTO: 1.02 (ref 1–1.03)
T4 FREE SERPL-MCNC: 0.96 NG/DL (ref 0.61–1.12)
TREPONEMA PALLIDUM IGG+IGM AB [PRESENCE] IN SERUM OR PLASMA BY IMMUNOASSAY: NORMAL
TSH SERPL DL<=0.05 MIU/L-ACNC: 0.36 UIU/ML (ref 0.45–4.5)
UROBILINOGEN UR STRIP-ACNC: <2 MG/DL
VZV IGG SER QL IA: NORMAL
WBC # BLD AUTO: 5.55 THOUSAND/UL (ref 4.31–10.16)
WBC #/AREA URNS AUTO: ABNORMAL /HPF

## 2024-09-27 PROCEDURE — 86803 HEPATITIS C AB TEST: CPT

## 2024-09-27 PROCEDURE — 87340 HEPATITIS B SURFACE AG IA: CPT

## 2024-09-27 PROCEDURE — 86901 BLOOD TYPING SEROLOGIC RH(D): CPT

## 2024-09-27 PROCEDURE — 84443 ASSAY THYROID STIM HORMONE: CPT

## 2024-09-27 PROCEDURE — 86850 RBC ANTIBODY SCREEN: CPT

## 2024-09-27 PROCEDURE — 84439 ASSAY OF FREE THYROXINE: CPT

## 2024-09-27 PROCEDURE — 86780 TREPONEMA PALLIDUM: CPT

## 2024-09-27 PROCEDURE — 86900 BLOOD TYPING SEROLOGIC ABO: CPT

## 2024-09-27 PROCEDURE — OBC: Performed by: OBSTETRICS & GYNECOLOGY

## 2024-09-27 PROCEDURE — 81001 URINALYSIS AUTO W/SCOPE: CPT

## 2024-09-27 PROCEDURE — 86787 VARICELLA-ZOSTER ANTIBODY: CPT

## 2024-09-27 PROCEDURE — 87389 HIV-1 AG W/HIV-1&-2 AB AG IA: CPT

## 2024-09-27 PROCEDURE — 36415 COLL VENOUS BLD VENIPUNCTURE: CPT

## 2024-09-27 PROCEDURE — 85025 COMPLETE CBC W/AUTO DIFF WBC: CPT

## 2024-09-27 PROCEDURE — 87086 URINE CULTURE/COLONY COUNT: CPT

## 2024-09-27 PROCEDURE — 86762 RUBELLA ANTIBODY: CPT

## 2024-09-27 NOTE — PROGRESS NOTES
OB INTAKE INTERVIEW 2024    Patient is 34 y.o. who presents for OB intake at 11w0d.  She is accompanied by her spouse during this encounter.  The father of her baby (Gregor) is involved in the pregnancy.      Patient's last menstrual period was 2024 (exact date).  Ultrasound: Measured 7 weeks 4 days on 2024  Estimated Date of Delivery: 25 confirmed by dating ultrasound.    Signs/Symptoms of Pregnancy:  Current pregnancy symptoms: breast tenderness and frequent urination  Constipation no  Headaches no  Cramping/spotting no  PICA cravings no    Diabetes:  Body mass index is 21.42 kg/m².  If patient has 1 or more, please order early 1 hour GTT  History of GDM no  BMI >35 no  History of PCOS or current metformin use no  History of LGA/macrosomic infant (4000g/9lbs) no    If patient has 2 or more, please order early 1 hour GTT  BMI>30 no  AMA no  First degree relative with type 2 diabetes no  History of chronic HTN, hyperlipidemia, elevated A1C no  High risk race (, , ,  or ) no    Hypertension: if you answer yes to any of the following, please order baseline preeclampsia labs (cbc, comprehensive metabolic panel, urine protein creatinine ratio, uric acid)  History of of chronic HTN no  History of gestational HTN no  History of preeclampsia, eclampsia, or HELLP syndrome no  History of diabetes no  History of lupus, autoimmune disease, kidney disease no    Thyroid: if yes order TSH with reflex T4  History of thyroid disease no    Bleeding Disorder or Hx of DVT - patient or first degree relative with history of. Order the following if not done previously.   (Factor V, antithrombin III, prothrombin gene mutation, protein C and S Ag, lupus anticoagulant, anticardiolipin, beta-2 glycoprotein)   no    OB/GYN:  History of abnormal pap smear no       Date of last pap smear 2021  History of HPV no  History of Herpes/HSV no  History of  other STI (gonorrhea, chlamydia, trich) no  History of prior  no  History of prior  no  History of  delivery prior to 36 weeks 6 days no  History of blood transfusion no  Ok for blood transfusion YES    Substance screening:   History of tobacco use no  Currently using tobacco no  Currently using alcohol no  Presently using drugs no  Past drug use  no  IV drug use- no  Partner drug use no  Parent/Family drug use no  Substance Use Screen Level N/A    MRSA Screening:   Does the pt have a hx of MRSA? no    Mental Health:  Hx of/or current dx of depression: no, Anxiety: YES,  Medications: YES Zoloft  EPDS Screen:  Negative / score 7    Dental Health:  Patient has seen a dentist in the past 6 months YES    Immunizations:  Influenza vaccine given this season YES   Discussed Tdap vaccine YES   Discussed COVID Vaccine YES     Genetic/MFM:  Do you or your partner have a history of any of the following in yourselves or first degree relatives?  Cystic fibrosis no  Spinal muscular atrophy no  Hemoglobinopathy/Sickle Cell/Thalassemia no  Fragile X Intellectual Disability no    If yes, discuss Carrier Screening and recommend consultation with [facility]/Genetic Counseling and place specific [facility] Referral for.    Discussed Carrier Screening being completed once in a lifetime as a standard of care lab test. Place orders for Cystic Fibrosis Gene Test (EVU416) and Spinal Muscular Atrophy DNA ([id number]).  Patient was informed that prior authorization needs to be completed for these tests and this may take 7-10 business days.  Patient does not desire testing for  has completed through [facility] .    ACOG Patient Education Cystic Fibrosis and Spinal Muscular Atrophy prenatal screening given.    Appointment for Nuchal Translucency Ultrasound at [facility] is scheduled for 10/4.    Interview education:  [facility] Pregnancy Essentials Book reviewed, discussed and attached to their AVS YES     Nurse/Family Partnership-patient may qualify NO;  referral placed NO     Prenatal lab work scripts YES    Extra labs ordered: Varicella zoster antibody IgG, TSH, and T4 free    Aspirin/Preeclampsia Screen    Risk Level Risk Factor Recommendation   LOW Prior Uncomplicated full-term delivery no No Aspirin recommendation        MODERATE Nulliparity YES Recommend low-dose aspirin if     BMI>30 no 2 or more moderate risk factors    Family History Preeclampsia (mother/sister) no     35yr old or greater no      or Low Socioeconomic no     IVF Pregnancy  YES     Personal History Risks (low birth weight, prior adverse preg outcome, >10yr preg interval) no         HIGH History of Preeclampsia no Recommend low-dose aspirin if     Multifetal gestation no 1 or more high risk factors    Chronic HTN no     Type 1 or 2 Diabetes no     Renal Disease no     Autoimmune Disease  no      Contraindications to ASA therapy:  NSAID/ ASA allergy: no  Nasal polyps: no  Asthma with history of ASA induced bronchospasm: no    Relative contraindications:  History of GI bleed: no  Active peptic ulcer disease: no  Severe hepatic dysfunction: no    Patient does meet recommendation to take ASA 162mg during this pregnancy from 12-36 wks to lower her risk of preeclampsia.  Instructions given and patient verbalized understanding.    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  Abnormal TSH levels      Details that I feel the provider should be aware of: Carlyn welcome their first pregnancy. They have transferred from [facility]. Carlyn returned from [place] yesterday. She is overall feeling well. PN1 labs ordered and reviewed. TSH lab ordered and reviewed. Blue folder given and reviewed.     PN1 visit scheduled. The patient was oriented to our practice, the navigator role, reviewed delivering physicians and [facility] for delivery. All questions  were answered.    Interviewed by:Lesly Frost RN

## 2024-09-28 LAB — BACTERIA UR CULT: NORMAL

## 2024-09-30 ENCOUNTER — TELEPHONE (OUTPATIENT)
Age: 34
End: 2024-09-30

## 2024-09-30 PROBLEM — Z3A.11 11 WEEKS GESTATION OF PREGNANCY: Status: ACTIVE | Noted: 2024-09-30

## 2024-09-30 PROBLEM — O09.811 PREGNANCY RESULTING FROM IN VITRO FERTILIZATION IN FIRST TRIMESTER: Status: ACTIVE | Noted: 2024-09-30

## 2024-09-30 PROBLEM — E03.8 SUBCLINICAL HYPOTHYROIDISM: Status: ACTIVE | Noted: 2024-09-30

## 2024-09-30 NOTE — RESULT ENCOUNTER NOTE
Overall your prenatal labs look very normal  Your TSH is mildly low, but the thyroid hormone is in the normal range.  We will repeat these levels in your second trimester  DrG

## 2024-09-30 NOTE — TELEPHONE ENCOUNTER
Patient called in returning phone call, pt was notified of Dr Todd's recommendations, Pt verbalized understanding, no further questions at this time

## 2024-09-30 NOTE — PATIENT INSTRUCTIONS
Thank you for choosing us for your  care today.  If you have any questions about your ultrasound or care, please do not hesitate to contact us or your primary obstetrician.        Some general instructions for your pregnancy are:    Exercise: Aim for 150 minutes per week of regular exercise.  Walking is great!  Nutrition: Choose healthy sources of calcium, iron, and protein.  Avoid ultraprocessed foods and added sugar.  Learn about Preeclampsia: preeclampsia is a common, potentially serious high blood pressure complication in pregnancy.  A blood pressure of 140mmHg (systolic or top number) or 90mmHg (diastolic or bottom number) should be evaluated by your doctor.  Aspirin is sometimes prescribed in early pregnancy to prevent preeclampsia in women with risk factors - ask your obstetrician if you should be on this medication.  For more resources, visit:  [email or web address].[email or web address]/[email or web address]  If you smoke, please try to quit completely but also try to reduce your smoking by as much as possible (as soon as possible).  Do not vape.  Please also avoid cannabis products.  Other warning signs to watch out for in pregnancy or postpartum: chest pain, obstructed breathing or shortness of breath, seizures, thoughts of hurting yourself or your baby, bleeding, a painful or swollen leg, fever, or headache (see [facility] Warning Signs [facility]).  If these happen call 911.  Itching is also not normal in pregnancy and if you experience this, especially over your hands and feet, potentially worse at night, notify your doctors.     The use of low dose aspirin in pregnancy (162mg) is recommended in women with an increased risk for preeclampsia, and was recommended today for you.  When started early in pregnancy (ideally 12-16 weeks but can be started as late as 28 weeks), low dose aspirin can reduce your risk of preeclampsia.  Low dose aspirin has been shown to be safe and without significant maternal or  fetal risk.  Side effects are generally none to mild, however, if you experience what you think may be an allergic reaction after starting aspirin, immediately stop it and notify your doctor.  If you have asthma or acid reflux and notice an increase in symptom frequency or severity, consider stopping this medication, and notify your doctor.  If you have had bariatric surgery, you may need a different dose of medication with or without acid-reducing medication.  We advise routine discontinuation of this medication at 36 weeks.  For more resources, visit:  https://mothertobaby.org/fact-sheets/low-dose-aspirin/  https://www.preeclampsia.org/aspirin  https://www.[email or web address].org/preeclampsia

## 2024-10-01 ENCOUNTER — NURSE TRIAGE (OUTPATIENT)
Dept: OTHER | Facility: OTHER | Age: 34
End: 2024-10-01

## 2024-10-01 NOTE — TELEPHONE ENCOUNTER
"Reason for Disposition  • SPOTTING (single or brief episode)    Answer Assessment - Initial Assessment Questions  1. ONSET: \"When did this bleeding start?\"        20 minutes ago today  2. BLEEDING SEVERITY: \"Describe the bleeding that you are having.\" \"How much bleeding is there?\"       1 episode, bigger than a quarter spotting of brown discharge  3. ABDOMEN PAIN: \"Do you have any pain?\" \"How bad is the pain?\"  (e.g., Scale 0-10; none, mild, moderate, or severe)      Denies  4. PREGNANCY: \"Do you know how many weeks or months pregnant you are?\" \"When was the first day of your last normal menstrual period?\"      11weeks 4 days MIGUEL 4/18/2025  5. ULTRASOUND: \"Have you had an ultrasound during this pregnancy?\"  Note: To confirm intrauterine pregnancy, placenta location.      9/11/2024  6. HEMODYNAMIC STATUS: \"Are you weak or feeling lightheaded?\" If Yes, ask: \"Can you stand and walk normally?\"       Denies  7. OTHER SYMPTOMS: \"What other symptoms are you having with the bleeding?\" (e.g., passed tissue, vaginal discharge, fever, menstrual-type cramps)      Denies, has a panty liner on currently with no discharge    Protocols used: Pregnancy - Vaginal Bleeding Less Than 20 Weeks [id number]    "

## 2024-10-01 NOTE — TELEPHONE ENCOUNTER
Care advice and recommendations given.   Patient verbalized understanding.    Please follow up with patient.

## 2024-10-01 NOTE — TELEPHONE ENCOUNTER
"Regarding: spotting/ 12 weeks  ----- Message from Ines MORRISON sent at 10/1/2024  7:35 PM EDT -----  \"I am 12 weeks and I am spotting medium brown.\"    "

## 2024-10-03 PROBLEM — O99.280 THYROID DISEASE AFFECTING PREGNANCY: Status: ACTIVE | Noted: 2024-10-03

## 2024-10-03 PROBLEM — E07.9 THYROID DISEASE AFFECTING PREGNANCY: Status: ACTIVE | Noted: 2024-10-03

## 2024-10-03 PROBLEM — Z3A.12 12 WEEKS GESTATION OF PREGNANCY: Status: ACTIVE | Noted: 2024-09-30

## 2024-10-04 ENCOUNTER — ROUTINE PRENATAL (OUTPATIENT)
Facility: HOSPITAL | Age: 34
End: 2024-10-04
Payer: COMMERCIAL

## 2024-10-04 VITALS
HEIGHT: 64 IN | DIASTOLIC BLOOD PRESSURE: 70 MMHG | BODY MASS INDEX: 21.68 KG/M2 | HEART RATE: 88 BPM | SYSTOLIC BLOOD PRESSURE: 124 MMHG | WEIGHT: 126.98 LBS

## 2024-10-04 DIAGNOSIS — O99.280 THYROID DISEASE AFFECTING PREGNANCY: ICD-10-CM

## 2024-10-04 DIAGNOSIS — O09.811 PREGNANCY RESULTING FROM IN VITRO FERTILIZATION IN FIRST TRIMESTER: Primary | ICD-10-CM

## 2024-10-04 DIAGNOSIS — Z36.82 ENCOUNTER FOR (NT) NUCHAL TRANSLUCENCY SCAN: ICD-10-CM

## 2024-10-04 DIAGNOSIS — N91.2 AMENORRHEA: ICD-10-CM

## 2024-10-04 DIAGNOSIS — Z3A.12 12 WEEKS GESTATION OF PREGNANCY: ICD-10-CM

## 2024-10-04 DIAGNOSIS — E07.9 THYROID DISEASE AFFECTING PREGNANCY: ICD-10-CM

## 2024-10-04 PROCEDURE — 76813 OB US NUCHAL MEAS 1 GEST: CPT | Performed by: OBSTETRICS & GYNECOLOGY

## 2024-10-04 PROCEDURE — 99244 OFF/OP CNSLTJ NEW/EST MOD 40: CPT | Performed by: NURSE PRACTITIONER

## 2024-10-04 RX ORDER — ASPIRIN 81 MG/1
162 TABLET ORAL DAILY
Qty: 180 TABLET | Refills: 3 | Status: SHIPPED | OUTPATIENT
Start: 2024-10-04

## 2024-10-04 NOTE — LETTER
"2024     Miri Ty PA-C  [address] [facility] [facility]  [address]  [facility] [address]    Patient: Calli Blair   YOB: 1990   Date of Visit: 10/4/2024       Dear EDGAR Ty:    Thank you for referring Calli Blair to me for evaluation. Below are my notes for this consultation.    If you have questions, please do not hesitate to call me. I look forward to following your patient along with you.         Sincerely,        Marine Cardona MD        CC: No Recipients    Marine Cardona MD  10/4/2024  9:22 AM  Sign when Signing Visit  [facility]: Ms. Blair was seen today at 12w0d for nuchal translucency ultrasound.  See ultrasound report under \"OB Procedures\" tab.      My recommendations are as follows:  We reviewed the availability of aneuploidy screening, as well as diagnostic testing, which are available to all pregnant women. We reviewed limitations, risks, and benefits of screening and testing.  She does not wish to pursue aneuploidy screening or diagnostic testing at this time. MSAFP screening should be ordered through your office at 15-20 weeks gestation, and completed prior to fetal anatomic survey. Additionally, carrier screening should be offered, per ACOG guidelines. A detailed anatomic survey as well as transvaginal cervical length screening are recommended between 20-22 weeks gestation.     Pregnancies achieved through in vitro fertilization (IVF) are associated with a slight increase in risk of several pregnancy complications, including abnormal placentation,  delivery, hypertensive disorders of pregnancy, low birth weight, and congenital malformations (specifically congenital heart disease). However, many of these risks are confounded by higher rates of multiple gestations, as well as medical complications among women undergoing IVF compared to those who conceive spontaneously. Fetal number and maternal co-morbidities are " stronger predictors of pregnancy outcome than method of conception itself. For women who achieve pregnancy through IVF we recommend:  -Fetal echocardiogram at 22-24 weeks gestation, in light of the slight increase in risk of congenital heart disease  -Evaluation of fetal growth around 32 weeks gestation   -Weekly non-stress test and MARII at 36 weeks gestation    The use of low dose aspirin in pregnancy (162mg) is recommended in women with a high risk, or multiple moderate risk factors for preeclampsia.  Aspirin therapy should be initiated between 12-28 weeks gestation, and is most effective if started prior to 16 weeks gestation, and stopped by 36 weeks gestation. Low dose aspirin in pregnancy has been shown to reduce the incidence of preeclampsia in women with risk factors, and has been shown to be safe and without significant maternal or fetal risk. In light of her risk factors (first pregnancy and IVF pregnancy), I recommend initiating aspirin therapy, which was prescribed today.    We reviewed her history of subclinical hypothyroidism which is characterized by elevated TSH, but normal Free T4 (FT4).   She is currently taking 76 mcg of levothyroxine, and last TFTs were TSH 0.358 with normal T4.  We reviewed that whether to treat with levothyroxine in pregnancy is controversial.  Although subclinical hypothyroidism is associated with pregnancy risks including miscarriage and  birth, it is not clear that treatment with levothyroxine improves pregnancy outcomes.  Importantly, levothyroxine treatment is not associated with adverse pregnancy risks. The decision to treat subclinical hypothyroidism in pregnancy should be individualized. If levothyroxine treatment is given, TSH levels should be monitored. Goal TSH levels in pregnancy recommended by the American Thyroid Association are 0.1-2.5 mIU/L (first trimester), 0.2-3.0 mIU/L (second trimester) and 0.3-3.0 mIU/L (third trimester). Levothyroxine dose should be  titrated to achieve these trimester-specific ranges. Thyroid function should be assessed at least each trimester using thyroid stimulating hormone (TSH) levels.  After medication dose-adjustments, labs should be repeated every 4-6 weeks until euthyroid. She is agreeable to discontinuing levothyroxine and rechecking TSH and free T4 in 4-6 weeks.     We discussed her history of depression. She is currently using Zoloft for support. We discussed that there is abundant safety data on SSRI use in pregnancy. There are significant risks associated with untreated and under-treated depression and other mental illnesses in pregnancy. The [facility] [place] is an additional resource for screening and treatment of depression. Regarding antidepressants in pregnancy, guidelines encourage women who took effective antidepressant regimens prior to pregnancy, to continue with the same treatment regimen. The use of selective serotonin reuptake inhibitors (SSRIs) in pregnancy is also associated with a risk of transient  withdrawal, which is typically mild, self-limited, and does not usually require  intensive care. SSRIs are compatible with breastfeeding (ACOG Practice Bulletin #92, Use of Psychiatric Medications in Pregnancy and Lactation). If desired, neonatology consultation is available in the third trimester to further discuss these  complications. I recommend early postpartum follow-up of her mood, and referral for therapy if needed.    We reviewed current recommendations regarding  Flu, COVID and RSV (third trimester) vaccines by the American College of Obstetricians and Gynecologists and the Society for Maternal-Fetal Medicine. We discussed reassuring pregnancy outcome information after vaccination. We discussed the increased severity of infections and the resultant maternal and fetal complications that can arise with a severe infection including  labor.  Vaccines have been found to  generate  antibodies in pregnant and lactating women similar to that observed in non-pregnant women. Vaccine-induced antibody levels were significantly greater than the levels found in response to natural infection. Immune transfer of these antibodies to neonates is found to occur via the placenta and breast milk.    Please don't hesitate to contact our office with any concerns or questions.    -Marine Cardona MD

## 2024-10-04 NOTE — PROGRESS NOTES
"[facility]: Ms. Blair was seen today at 12w0d for nuchal translucency ultrasound.  See ultrasound report under \"OB Procedures\" tab.      My recommendations are as follows:  We reviewed the availability of aneuploidy screening, as well as diagnostic testing, which are available to all pregnant women. We reviewed limitations, risks, and benefits of screening and testing.  She does not wish to pursue aneuploidy screening or diagnostic testing at this time. MSAFP screening should be ordered through your office at 15-20 weeks gestation, and completed prior to fetal anatomic survey. Additionally, carrier screening should be offered, per ACOG guidelines. A detailed anatomic survey as well as transvaginal cervical length screening are recommended between 20-22 weeks gestation.     Pregnancies achieved through in vitro fertilization (IVF) are associated with a slight increase in risk of several pregnancy complications, including abnormal placentation,  delivery, hypertensive disorders of pregnancy, low birth weight, and congenital malformations (specifically congenital heart disease). However, many of these risks are confounded by higher rates of multiple gestations, as well as medical complications among women undergoing IVF compared to those who conceive spontaneously. Fetal number and maternal co-morbidities are stronger predictors of pregnancy outcome than method of conception itself. For women who achieve pregnancy through IVF we recommend:  -Fetal echocardiogram at 22-24 weeks gestation, in light of the slight increase in risk of congenital heart disease  -Evaluation of fetal growth around 32 weeks gestation   -Weekly non-stress test and MARII at 36 weeks gestation    The use of low dose aspirin in pregnancy (162mg) is recommended in women with a high risk, or multiple moderate risk factors for preeclampsia.  Aspirin therapy should be initiated between 12-28 weeks gestation, and is most " effective if started prior to 16 weeks gestation, and stopped by 36 weeks gestation. Low dose aspirin in pregnancy has been shown to reduce the incidence of preeclampsia in women with risk factors, and has been shown to be safe and without significant maternal or fetal risk. In light of her risk factors (first pregnancy and IVF pregnancy), I recommend initiating aspirin therapy, which was prescribed today.    We reviewed her history of subclinical hypothyroidism which is characterized by elevated TSH, but normal Free T4 (FT4).   She is currently taking 76 mcg of levothyroxine, and last TFTs were TSH 0.358 with normal T4.  We reviewed that whether to treat with levothyroxine in pregnancy is controversial.  Although subclinical hypothyroidism is associated with pregnancy risks including miscarriage and  birth, it is not clear that treatment with levothyroxine improves pregnancy outcomes.  Importantly, levothyroxine treatment is not associated with adverse pregnancy risks. The decision to treat subclinical hypothyroidism in pregnancy should be individualized. If levothyroxine treatment is given, TSH levels should be monitored. Goal TSH levels in pregnancy recommended by the American Thyroid Association are 0.1-2.5 mIU/L (first trimester), 0.2-3.0 mIU/L (second trimester) and 0.3-3.0 mIU/L (third trimester). Levothyroxine dose should be titrated to achieve these trimester-specific ranges. Thyroid function should be assessed at least each trimester using thyroid stimulating hormone (TSH) levels.  After medication dose-adjustments, labs should be repeated every 4-6 weeks until euthyroid. She is agreeable to discontinuing levothyroxine and rechecking TSH and free T4 in 4-6 weeks.     We discussed her history of depression. She is currently using Zoloft for support. We discussed that there is abundant safety data on SSRI use in pregnancy. There are significant risks associated with untreated and under-treated  depression and other mental illnesses in pregnancy. The [facility] is an additional resource for screening and treatment of depression. Regarding antidepressants in pregnancy, guidelines encourage women who took effective antidepressant regimens prior to pregnancy, to continue with the same treatment regimen. The use of selective serotonin reuptake inhibitors (SSRIs) in pregnancy is also associated with a risk of transient  withdrawal, which is typically mild, self-limited, and does not usually require  intensive care. SSRIs are compatible with breastfeeding (ACOG Practice Bulletin #92, Use of Psychiatric Medications in Pregnancy and Lactation). If desired, neonatology consultation is available in the third trimester to further discuss these  complications. I recommend early postpartum follow-up of her mood, and referral for therapy if needed.    We reviewed current recommendations regarding  Flu, COVID and RSV (third trimester) vaccines by the American College of Obstetricians and Gynecologists and the Society for Maternal-Fetal Medicine. We discussed reassuring pregnancy outcome information after vaccination. We discussed the increased severity of infections and the resultant maternal and fetal complications that can arise with a severe infection including  labor.  Vaccines have been found to generate  antibodies in pregnant and lactating women similar to that observed in non-pregnant women. Vaccine-induced antibody levels were significantly greater than the levels found in response to natural infection. Immune transfer of these antibodies to neonates is found to occur via the placenta and breast milk.    Please don't hesitate to contact our office with any concerns or questions.    -Marine Cardona MD

## 2024-10-11 ENCOUNTER — INITIAL PRENATAL (OUTPATIENT)
Dept: OBGYN CLINIC | Facility: CLINIC | Age: 34
End: 2024-10-11
Payer: COMMERCIAL

## 2024-10-11 VITALS — WEIGHT: 126.4 LBS | SYSTOLIC BLOOD PRESSURE: 110 MMHG | DIASTOLIC BLOOD PRESSURE: 60 MMHG | BODY MASS INDEX: 21.7 KG/M2

## 2024-10-11 DIAGNOSIS — Z3A.13 13 WEEKS GESTATION OF PREGNANCY: ICD-10-CM

## 2024-10-11 DIAGNOSIS — E07.9 THYROID DISEASE AFFECTING PREGNANCY: ICD-10-CM

## 2024-10-11 DIAGNOSIS — O09.811 PREGNANCY RESULTING FROM IN VITRO FERTILIZATION IN FIRST TRIMESTER: Primary | ICD-10-CM

## 2024-10-11 DIAGNOSIS — O99.280 THYROID DISEASE AFFECTING PREGNANCY: ICD-10-CM

## 2024-10-11 DIAGNOSIS — Z23 NEED FOR INFLUENZA VACCINATION: ICD-10-CM

## 2024-10-11 PROBLEM — O99.341 DEPRESSION AFFECTING PREGNANCY IN FIRST TRIMESTER, ANTEPARTUM: Status: ACTIVE | Noted: 2024-10-11

## 2024-10-11 PROBLEM — F32.A DEPRESSION AFFECTING PREGNANCY IN FIRST TRIMESTER, ANTEPARTUM: Status: ACTIVE | Noted: 2024-10-11

## 2024-10-11 PROCEDURE — 90471 IMMUNIZATION ADMIN: CPT | Performed by: OBSTETRICS & GYNECOLOGY

## 2024-10-11 PROCEDURE — PNV: Performed by: OBSTETRICS & GYNECOLOGY

## 2024-10-11 PROCEDURE — 87591 N.GONORRHOEAE DNA AMP PROB: CPT | Performed by: OBSTETRICS & GYNECOLOGY

## 2024-10-11 PROCEDURE — 90656 IIV3 VACC NO PRSV 0.5 ML IM: CPT | Performed by: OBSTETRICS & GYNECOLOGY

## 2024-10-11 PROCEDURE — 87491 CHLMYD TRACH DNA AMP PROBE: CPT | Performed by: OBSTETRICS & GYNECOLOGY

## 2024-10-11 NOTE — PROGRESS NOTES
34 y.o.  female at 13w0d (Estimated Date of Delivery: 25) for PNV.    Cramping: no  Bleeding: no  LOF: no  NT/13 week scan scheduled: already completed  AFP ordered if indicated: yes  Prenatal labs complete (including Heb B, HIV): yes; date completed   Flu vaccine up to date: administered today   Covid vaccine up to date: counseled     Obstetric History  IVF pregnancy      GYN History   Last pap UTD     Medical History: depression on zoloft, hypothyroidism prev on synthroid. Recheck TSH in 3 weeks   Surgical History: n/a     Pre- Vitals      Flowsheet Row Most Recent Value   Prenatal Assessment    Fetal Heart Rate 150   Prenatal Vitals    Blood Pressure 110/60   Weight - Scale 57.3 kg (126 lb 6.4 oz)   Urine Albumin/Glucose    Dilation/Effacement/Station    Vaginal Drainage    Edema           TWG: 3.81 kg (8 lb 6.4 oz)    Physical Exam  Constitutional:       Appearance: Normal appearance.   Genitourinary:      Genitourinary Comments: Deferred    HENT:      Head: Normocephalic and atraumatic.   Cardiovascular:      Rate and Rhythm: Normal rate.   Pulmonary:      Effort: Pulmonary effort is normal.   Abdominal:      Palpations: Abdomen is soft.      Tenderness: There is no abdominal tenderness. There is no guarding or rebound.   Musculoskeletal:         General: Normal range of motion.      Cervical back: Normal range of motion.   Neurological:      General: No focal deficit present.      Mental Status: She is alert. Mental status is at baseline.   Psychiatric:         Mood and Affect: Mood normal.         Pap collected: no, UTD   GC collected:yes  Plans to breastfeed: yes  Weight gain discussed. Exercise encouraged.   Oriented to practice/delivery location.       RTO in 4 weeks.

## 2024-10-13 LAB
C TRACH DNA SPEC QL NAA+PROBE: NEGATIVE
N GONORRHOEA DNA SPEC QL NAA+PROBE: NEGATIVE

## 2024-10-22 DIAGNOSIS — F41.1 GAD (GENERALIZED ANXIETY DISORDER): ICD-10-CM

## 2024-10-29 ENCOUNTER — TELEPHONE (OUTPATIENT)
Age: 34
End: 2024-10-29

## 2024-10-29 NOTE — TELEPHONE ENCOUNTER
I tried to call pt to inform them the provider had a change in schedule and is no longer available on Wednesdays. I ask them to give us a call back to r/s their appt and we can see what other dates, times and locations we have available.

## 2024-11-06 ENCOUNTER — ROUTINE PRENATAL (OUTPATIENT)
Dept: OBGYN CLINIC | Facility: CLINIC | Age: 34
End: 2024-11-06

## 2024-11-06 VITALS — BODY MASS INDEX: 21.97 KG/M2 | SYSTOLIC BLOOD PRESSURE: 110 MMHG | DIASTOLIC BLOOD PRESSURE: 62 MMHG | WEIGHT: 128 LBS

## 2024-11-06 DIAGNOSIS — Z3A.16 16 WEEKS GESTATION OF PREGNANCY: ICD-10-CM

## 2024-11-06 DIAGNOSIS — O09.812 PREGNANCY RESULTING FROM IN VITRO FERTILIZATION, SECOND TRIMESTER: Primary | ICD-10-CM

## 2024-11-06 PROCEDURE — PNV

## 2024-11-06 NOTE — PROGRESS NOTES
Patient is a 33 YO  female presenting to the office at 16w5d for routine OB care.   Patient is feeling well today. Requesting referral to pelvic floor PT.  AFP previously ordered, not yet completed. Patient plans to complete AFP and TSH within the next few weeks. She is not currently taking levothyroxine as she was put on a low dose during fertility treatments and her TSH dropped significantly.   US with MFM 2024  Fetal heart rate: 150  BP: 110/62  TWG: 10lb  Fetal Movement: not feeling yet   LOF: no  VB: no  CTX: no  Reviewed precautions  Call for concerns  RTO 4 weeks

## 2024-11-06 NOTE — PROGRESS NOTES
34 y.o.  female at 16w5d (Estimated Date of Delivery: 25) for PNV.      TWG: 3.81 kg (8 lb 6.4 oz)    Leakage of fluid: no  Vaginal bleeding: no  Contractions/Cramping: no  Fetal movement: no    RTO in 4 weeks.

## 2024-11-13 ENCOUNTER — APPOINTMENT (OUTPATIENT)
Dept: LAB | Facility: CLINIC | Age: 34
End: 2024-11-13
Payer: COMMERCIAL

## 2024-11-13 ENCOUNTER — RESULTS FOLLOW-UP (OUTPATIENT)
Dept: OBGYN CLINIC | Facility: CLINIC | Age: 34
End: 2024-11-13

## 2024-11-13 DIAGNOSIS — Z3A.13 13 WEEKS GESTATION OF PREGNANCY: ICD-10-CM

## 2024-11-13 DIAGNOSIS — E07.9 THYROID DISEASE AFFECTING PREGNANCY: ICD-10-CM

## 2024-11-13 DIAGNOSIS — O09.811 PREGNANCY RESULTING FROM IN VITRO FERTILIZATION IN FIRST TRIMESTER: ICD-10-CM

## 2024-11-13 DIAGNOSIS — O99.280 THYROID DISEASE AFFECTING PREGNANCY: ICD-10-CM

## 2024-11-13 LAB — TSH SERPL DL<=0.05 MIU/L-ACNC: 2.43 UIU/ML (ref 0.45–4.5)

## 2024-11-13 PROCEDURE — 82105 ALPHA-FETOPROTEIN SERUM: CPT

## 2024-11-13 PROCEDURE — 84443 ASSAY THYROID STIM HORMONE: CPT

## 2024-11-13 PROCEDURE — 36415 COLL VENOUS BLD VENIPUNCTURE: CPT

## 2024-11-15 LAB
2ND TRIMESTER 4 SCREEN SERPL-IMP: NORMAL
AFP ADJ MOM SERPL: 0.87
AFP INTERP AMN-IMP: NORMAL
AFP INTERP SERPL-IMP: NORMAL
AFP INTERP SERPL-IMP: NORMAL
AFP SERPL-MCNC: 40.5 NG/ML
AGE AT DELIVERY: 34.7 YR
GA METHOD: NORMAL
GA: 17.7 WEEKS
IDDM PATIENT QL: NO
MULTIPLE PREGNANCY: NO
NEURAL TUBE DEFECT RISK FETUS: NORMAL %

## 2024-11-29 ENCOUNTER — ROUTINE PRENATAL (OUTPATIENT)
Facility: HOSPITAL | Age: 34
End: 2024-11-29
Payer: COMMERCIAL

## 2024-11-29 VITALS
SYSTOLIC BLOOD PRESSURE: 124 MMHG | OXYGEN SATURATION: 99 % | HEART RATE: 83 BPM | HEIGHT: 64 IN | WEIGHT: 134.7 LBS | DIASTOLIC BLOOD PRESSURE: 70 MMHG | BODY MASS INDEX: 23 KG/M2

## 2024-11-29 DIAGNOSIS — O09.812 PREGNANCY RESULTING FROM IN VITRO FERTILIZATION IN SECOND TRIMESTER: ICD-10-CM

## 2024-11-29 DIAGNOSIS — Z36.86 ENCOUNTER FOR ANTENATAL SCREENING FOR CERVICAL LENGTH: ICD-10-CM

## 2024-11-29 DIAGNOSIS — Z3A.20 20 WEEKS GESTATION OF PREGNANCY: Primary | ICD-10-CM

## 2024-11-29 PROCEDURE — 76811 OB US DETAILED SNGL FETUS: CPT | Performed by: OBSTETRICS & GYNECOLOGY

## 2024-11-29 PROCEDURE — 99213 OFFICE O/P EST LOW 20 MIN: CPT | Performed by: OBSTETRICS & GYNECOLOGY

## 2024-11-29 PROCEDURE — 76817 TRANSVAGINAL US OBSTETRIC: CPT | Performed by: OBSTETRICS & GYNECOLOGY

## 2024-11-29 NOTE — PROGRESS NOTES
Ultrasound Probe Disinfection    A transvaginal ultrasound was performed.   Prior to use, disinfection was performed with High Level Disinfection Process ([employer]).  Probe serial number [id number]: [id number] was used.    Tierra Soler  11/29/24  10:42 AM

## 2024-11-29 NOTE — PROGRESS NOTES
The patient was seen today for an ultrasound.  Please see ultrasound report (located under Ob Procedures) for additional details.   Thank you very much for allowing us to participate in the care of this very nice patient.  Should you have any questions, please do not hesitate to contact me.     Vinnie Fagan MD FACOG  Attending Physician, Maternal-Fetal Medicine  [facility]

## 2024-12-03 ENCOUNTER — ROUTINE PRENATAL (OUTPATIENT)
Dept: OBGYN CLINIC | Facility: CLINIC | Age: 34
End: 2024-12-03

## 2024-12-03 ENCOUNTER — EVALUATION (OUTPATIENT)
Dept: PHYSICAL THERAPY | Facility: CLINIC | Age: 34
End: 2024-12-03
Payer: COMMERCIAL

## 2024-12-03 VITALS — BODY MASS INDEX: 23.17 KG/M2 | DIASTOLIC BLOOD PRESSURE: 60 MMHG | SYSTOLIC BLOOD PRESSURE: 112 MMHG | WEIGHT: 135 LBS

## 2024-12-03 DIAGNOSIS — O09.812 PREGNANCY RESULTING FROM IN VITRO FERTILIZATION, SECOND TRIMESTER: ICD-10-CM

## 2024-12-03 DIAGNOSIS — Z3A.16 16 WEEKS GESTATION OF PREGNANCY: ICD-10-CM

## 2024-12-03 DIAGNOSIS — O09.812 PREGNANCY RESULTING FROM IN VITRO FERTILIZATION IN SECOND TRIMESTER: ICD-10-CM

## 2024-12-03 DIAGNOSIS — E07.9 THYROID DISEASE AFFECTING PREGNANCY: ICD-10-CM

## 2024-12-03 DIAGNOSIS — Z3A.20 20 WEEKS GESTATION OF PREGNANCY: Primary | ICD-10-CM

## 2024-12-03 DIAGNOSIS — O99.280 THYROID DISEASE AFFECTING PREGNANCY: ICD-10-CM

## 2024-12-03 PROCEDURE — 97530 THERAPEUTIC ACTIVITIES: CPT | Performed by: PHYSICAL THERAPIST

## 2024-12-03 PROCEDURE — 97162 PT EVAL MOD COMPLEX 30 MIN: CPT | Performed by: PHYSICAL THERAPIST

## 2024-12-03 PROCEDURE — PNV: Performed by: PHYSICIAN ASSISTANT

## 2024-12-03 NOTE — PROGRESS NOTES
PT Evaluation     Today's date: 12/3/2024  Patient name: Calli Blair  : 1990  MRN: [id number]  Referring provider: Lillie Rodríguez, *  Dx:   Encounter Diagnosis     ICD-10-CM    1. Pregnancy resulting from in vitro fertilization, second trimester  O09.812 Ambulatory Referral to Physical Therapy      2. 16 weeks gestation of pregnancy  Z3A.16 Ambulatory Referral to Physical Therapy          Start Time: 1700  Stop Time: 1745  Total time in clinic (min): 45 minutes    Assessment  Impairments: abnormal muscle firing, abnormal muscle tone, abnormal or restricted ROM, impaired physical strength, lacks appropriate home exercise program, participation limitations and activity limitations    Assessment details: Calli Blair is a 34 y.o. year-old female who presents with reports of leakage/incontinence with stress related activities such as coughing, sneezing, or jumping. Patient is currently 20.5 weeks pregnant (due 2025). Internal assessment of pelvic floor muscles not performed today due to current pregnancy. Grossly assessed externally revealed difficulty with PFM contraction and isolation and required max cuing and prompting on proper muscle activation despite changing in positions. Weakness noted in hip adductors, hip extensors, and TA as well. Patient was educated on importance of proper posture and quality of activation over quantity or reps. Education on overall anatomy/physiology and etiology of urinary incontinence given current impairments. Decreased PFM strength and endurance affect overall pelvic floor function in order to maintain proper support of visceral structures and possibly contributing to her stress incontinence that happens on occasion. She will benefit from skilled PT for pelvic floor rehab to improve PFM and core strength, improve proper breathing mechanics, improve relaxation of PFM and glut musculature, and improve overall quality of life. Patient would benefit from  skilled PT services to address these impairments and to maximize function.  Thank you for the referral.       Goals  Impairment Goals 4-6 weeks   In order to maximize function patient will be able to...   - Decrease intensity/duration/frequency of pain to 4/10  - Patient will report reduced leakage with daily activities such as coughing, sneezing, etc.   - Increase core and PFM strength to 4/5 throughout to maintain proper stability and support for visceral structures  - Increase hip strength to 4/5 throughout to improve PFM and overall pelvic stability with ADLs  - Demonstrate improved hip flexibility as demonstrated by increased ROM through therapeutic exercise    Functional Goals 6-8 weeks  In order to return to prior level of function patient will be able to...   - Participate in ADL's/IADL's/sport specific activities with no greater than 2/10 pain.   - Demonstrate independence and compliant with HEP  - Demonstrate functional activities with good core/glute/PF muscle strength without compensation/pain/difficulty   - Maintain prolonged sitting or standing without difficulty or pain.     Plan  Patient would benefit from: skilled physical therapy  Planned modality interventions: cryotherapy, electrical stimulation/Russian stimulation and thermotherapy: hydrocollator packs    Planned therapy interventions: balance/weight bearing training, body mechanics training, flexibility, functional ROM exercises, transfer training, home exercise program, therapeutic activities, therapeutic exercise, stretching, strengthening, massage, Flores taping, neuromuscular re-education, patient education, manual therapy, postural training, joint mobilization, IASTM, kinesiology taping and nerve gliding    Frequency: 1-2x week  Duration in weeks: 4  Treatment plan discussed with: patient, PTA and referring physician        PT Pelvic Floor Subjective:   History of Present Illness:   Calli Blair is a 34 y.o. year-old female who  presents to outpatient PT currently pregnant through IVF. Patient is 20.5 weeks pregnant at this time. Patient reports she started noticing some urinary incontinence with certain stress-related activities such as coughing, sneezing, jumping, etc. She also notices some vaginal discharge at a very minimal. 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)    Life stress severity: mild    History of Depression: yes    on medication, exercisePronouns: she/her  Diet and Exercise:    Diet:balanced nutrition    Exercise type: weight training and walking    Exercise frequency: 2-3 times per week  OB/ gyn History    Gestational History:     Prior Pregnancy: No      Delivery Complications:  Currently pregnant  Bladder Function:     Voiding Difficulties positive for: urgency and frequent urination      Voiding Difficulties negative for: straining and incomplete emptying       Voiding Difficulties comments:     Voiding frequency: every 1-2 hours    Urinary leakage: urine leakage    Urinary leakage aggravated by: coughing, sneezing, exercise (jumping) and post-void dribble    Urinary leakage not aggravated by: laughing    Nocturia (episodes per night): 0    Painful urination: No      Fluid Intake Type:  Water    Intake (ounces): Water: 50, Coffee: 8,   Incontinence Management:     Pads/Diaper Use:  Day    Pads/Diapers Additional Comments: panty liner  Bowel Function:     Bowel frequency: daily    Stool softener use: no stool softeners  Pain:     Current pain ratin    At best pain ratin    At worst pain ratin    Location:  Left low back    Onset:  Less than 1 month ago    Quality:  Dull ache (muscle spasm)    Aggravating factors:  Prolonged positions    Duration of symptoms:  Brief    Relieving factors:  Change in position    Progression:  No  change  Treatments:     Current treatment: physical therapy    Patient Goals:     Patient goals for therapy:  Decreased pain, improved bladder or bowel function, improved comfort, improved quality of life and improved pain management      Objective     Palpation   Left   Muscle spasm in the adductor brevis, adductor longus, adductor adonis, gluteus medius, iliopsoas, piriformis and quadratus lumborum.   Tenderness of the iliopsoas.     Right   Muscle spasm in the adductor brevis, adductor longus, adductor adonis, gluteus medius, iliopsoas, piriformis and quadratus lumborum.   Tenderness of the iliopsoas.     Passive Range of Motion     Additional Passive Range of Motion Details  Passive LE stretching bilaterally moderate tightness without pain    Strength/Myotome Testing     Left Hip   Planes of Motion   Flexion: 4-  Extension: 3  Abduction: 4-  Adduction: 3+  External rotation: 4-  Internal rotation: 3+    Right Hip   Planes of Motion   Flexion: 4-  Extension: 3  Abduction: 4-  Adduction: 3+  External rotation: 4-  Internal rotation: 3+              POC Expires Auth Status Start Date Expiration Date PT Visit Limit    1/3/2025 No Auth Req.   BOMN   Date 12/3/2024       Used 1       Remaining           Diagnosis: current pregnancy through IVF (due 4/18/2025), stress incontinence   Precautions: anxiety/depression   Next Physician's Appt:   [employer] HEP: [id number]    Manuals 12/3       [facility]        PROM                Neuro Re-Ed        Biofeedback        PFM Contract        Quick Flicks        TA ball press        Glute Set        TA + PFM w/ hip ADD ball squeeze        TA + PFM w/ BKFO        TA + PFM w/ H/L marches                                Ther Ex        SKTC        DKTC w/ ball        LTRs        Butterfly ADD Stretch        MIRELA/ piriformis stretch        Happy Baby        Standing adductor stretch        SB pelvic floor circles                                 Ther Activity              Bike for LE mobility              PFM w/ STS        TA + PFM w/ lifting/carrying tasks                Diaphragmatic Breathing        Bladder Diary        Pt Ed HEP, POC       Re-Evaluation             Modalities             Heat/ice (PRN)

## 2024-12-03 NOTE — PROGRESS NOTES
34 y.o.  female at 20w4d (Estimated Date of Delivery: 25) for PNV.    Pre- Vitals      Flowsheet Row Most Recent Value   Prenatal Assessment    Movement Present   Prenatal Vitals    Blood Pressure 112/60   Weight - Scale 61.2 kg (135 lb)   Urine Albumin/Glucose    Dilation/Effacement/Station    Vaginal Drainage    Edema           TW.711 kg (17 lb)    Leakage of fluid: no  Vaginal bleeding: no  Contractions/Cramping: no  Fetal movement: no  Pt is feeling well  Normal level 2 us last week  Not having consistent FM yet--occ flutters  Has recd flu shot    RTO in 4 weeks.

## 2024-12-10 ENCOUNTER — OFFICE VISIT (OUTPATIENT)
Dept: PHYSICAL THERAPY | Facility: CLINIC | Age: 34
End: 2024-12-10
Payer: COMMERCIAL

## 2024-12-10 DIAGNOSIS — M62.89 PELVIC FLOOR DYSFUNCTION: Primary | ICD-10-CM

## 2024-12-10 PROCEDURE — 97110 THERAPEUTIC EXERCISES: CPT | Performed by: PHYSICAL THERAPIST

## 2024-12-10 PROCEDURE — 97140 MANUAL THERAPY 1/> REGIONS: CPT

## 2024-12-10 PROCEDURE — 97112 NEUROMUSCULAR REEDUCATION: CPT

## 2024-12-10 NOTE — PROGRESS NOTES
"Daily Note     Today's date: 12/10/2024  Patient name: Calli Blair  : 1990  MRN: [id number]  Referring provider: Lillie Rodríguez, *  Dx:   Encounter Diagnosis     ICD-10-CM    1. Pelvic floor dysfunction  M62.89           Start Time: 1700  Stop Time: 1745  Total time in clinic (min): 45 minutes    Subjective: Pt states that she noticed more pain in her thoracic / mid-back region today. She reports she thinks she strained it from       Objective: See treatment diary below      Assessment: Tolerated treatment well.  Focused on muscle activation for glut, TA and PF along with stretching to alleviate back pain.  STM to TS paraspinals on R with muscle spasms noted which were decreased after manuals.  Patient responded well to seated T/S extensions with 1/2 FR. Noted some pain and tenderness with attempting supine foam roller extensions.       Plan: Continue per plan of care.         POC Expires Auth Status Start Date Expiration Date PT Visit Limit    1/3/2025 No Auth Req.   BOMN   Date 12/3/2024 12/10/2024      Used 1 2      Remaining           Diagnosis: current pregnancy through IVF (due 2025), stress incontinence   Precautions: anxiety/depression   Next Physician's Appt:   [employer] HEP: [id number]    Manuals 12/3 12/10      STM  TH: R TS paraspinal      PROM                Neuro Re-Ed        Biofeedback        PFM Contract  Quadruped: BI/MARIE  10x      Quick Flicks        TA ball press  3\"x15 supine and quad BI/MARIE 10x      Glute Set  3\"x15      TA + PFM w/ hip ADD ball squeeze  No TA 3\"x10      TA + PFM w/ BKFO        TA + PFM w/ H/L marches                                Ther Ex        SKTC        DKTC w/ ball        LTRs  10x      Butterfly ADD Stretch        MIRELA/ piriformis stretch        Happy Baby        Standing adductor stretch        SB pelvic floor circles        Open books  10x ea      Supine FR extensions  10x      T/S seated extensions  1/2 FR 2x10                       Ther Activity  "             Bike for LE mobility             PFM w/ STS        TA + PFM w/ lifting/carrying tasks                Diaphragmatic Breathing        Bladder Diary        Pt Ed HEP, POC       Re-Evaluation             Modalities             Heat/ice (PRN)

## 2024-12-17 ENCOUNTER — OFFICE VISIT (OUTPATIENT)
Dept: PHYSICAL THERAPY | Facility: CLINIC | Age: 34
End: 2024-12-17
Payer: COMMERCIAL

## 2024-12-17 DIAGNOSIS — M62.89 PELVIC FLOOR DYSFUNCTION: Primary | ICD-10-CM

## 2024-12-17 PROCEDURE — 97140 MANUAL THERAPY 1/> REGIONS: CPT

## 2024-12-17 PROCEDURE — 97112 NEUROMUSCULAR REEDUCATION: CPT | Performed by: PHYSICAL THERAPIST

## 2024-12-17 PROCEDURE — 97110 THERAPEUTIC EXERCISES: CPT

## 2024-12-17 NOTE — PROGRESS NOTES
"Daily Note     Today's date: 2024  Patient name: Calli Blair  : 1990  MRN: [id number]  Referring provider: Lillie Rodríguez, *  Dx:   Encounter Diagnosis     ICD-10-CM    1. Pelvic floor dysfunction  M62.89           Start Time: 1700  Stop Time: 1745  Total time in clinic (min): 45 minutes    Subjective: Pt states that her TS  region feels better after what we did in therapy last visit.  The past few days she reports more LBP.       Objective: See treatment diary below      Assessment: Tolerated treatment well.  Session focused on muscle activation to improve TA and glut strength along with stretching to decrease muscle tightness.  Pt notes relief of LB sx's with stretching.  MIRELA stretch with increased stretch L>R.  B/L tightness noted in hamstrings with decreased LBP after.  Muscle spasms noted L glut >R, and L LS paraspinals.  Pt will benefit from continued therapy.  Will progress as able.      Plan: Continue per plan of care.         POC Expires Auth Status Start Date Expiration Date PT Visit Limit    1/3/2025 No Auth Req.   BOMN   Date 12/3/2024 12/10/2024 2024     Used 1 2 3     Remaining           Diagnosis: current pregnancy through IVF (due 2025), stress incontinence   Precautions: anxiety/depression   Next Physician's Appt:   [employer] HEP: [id number]    Manuals 12/3 12/10 12/17     STM  TH: R TS paraspinal TH: Lumbar     PROM                Neuro Re-Ed        Biofeedback        PFM Contract  Quadruped: BI/MARIE  10x      Quick Flicks        TA ball press  3\"x15 supine and quad BI/MARIE 10x 3\"x 10 supine     Glute Set  3\"x15 3\"x15     Glut set alternating   10x 2\" ea     TA + PFM w/ hip ADD ball squeeze  No TA 3\"x10      TA + PFM w/ BKFO        TA + PFM w/ H/L marches                                Ther Ex        SKTC   5-10\" x10 ea     DKTC w/ ball        LTRs  10x 10x     Butterfly ADD Stretch        MIRELA/ piriformis stretch   MIRELA 10x10\" ea     Happy Baby        Standing " "adductor stretch        SB pelvic floor circles        Open books  10x ea      Supine FR extensions  10x      T/S seated extensions  1/2 FR 2x10      Hamstring stretch   10x10\" ea              Ther Activity              Bike for LE mobility             PFM w/ STS        TA + PFM w/ lifting/carrying tasks                Diaphragmatic Breathing        Bladder Diary        Pt Ed HEP, POC       Re-Evaluation             Modalities             Heat/ice (PRN)                                       "

## 2024-12-18 ENCOUNTER — APPOINTMENT (OUTPATIENT)
Dept: PHYSICAL THERAPY | Facility: CLINIC | Age: 34
End: 2024-12-18
Payer: COMMERCIAL

## 2024-12-19 PROBLEM — Z3A.22 22 WEEKS GESTATION OF PREGNANCY: Status: ACTIVE | Noted: 2024-09-30

## 2024-12-20 ENCOUNTER — ROUTINE PRENATAL (OUTPATIENT)
Facility: HOSPITAL | Age: 34
End: 2024-12-20
Payer: COMMERCIAL

## 2024-12-20 VITALS
WEIGHT: 137.57 LBS | BODY MASS INDEX: 23.49 KG/M2 | DIASTOLIC BLOOD PRESSURE: 56 MMHG | HEART RATE: 80 BPM | SYSTOLIC BLOOD PRESSURE: 102 MMHG | HEIGHT: 64 IN

## 2024-12-20 DIAGNOSIS — E03.8 SUBCLINICAL HYPOTHYROIDISM: ICD-10-CM

## 2024-12-20 DIAGNOSIS — O09.812 PREGNANCY RESULTING FROM IN VITRO FERTILIZATION IN SECOND TRIMESTER: ICD-10-CM

## 2024-12-20 DIAGNOSIS — Z3A.23 23 WEEKS GESTATION OF PREGNANCY: Primary | ICD-10-CM

## 2024-12-20 PROCEDURE — 76825 ECHO EXAM OF FETAL HEART: CPT | Performed by: OBSTETRICS & GYNECOLOGY

## 2024-12-20 PROCEDURE — 76827 ECHO EXAM OF FETAL HEART: CPT | Performed by: OBSTETRICS & GYNECOLOGY

## 2024-12-20 PROCEDURE — 76815 OB US LIMITED FETUS(S): CPT | Performed by: OBSTETRICS & GYNECOLOGY

## 2024-12-20 PROCEDURE — 93325 DOPPLER ECHO COLOR FLOW MAPG: CPT | Performed by: OBSTETRICS & GYNECOLOGY

## 2024-12-20 PROCEDURE — 99214 OFFICE O/P EST MOD 30 MIN: CPT | Performed by: OBSTETRICS & GYNECOLOGY

## 2024-12-20 NOTE — LETTER
"   Date: 2024    Lillie Rodríguez PA-C  [address]   [address]  [place] [id number]    Patient: Calli Blair   YOB: 1990   Date of Visit: 2024   Gestational age 23w0d   Nature of this communication: Routine though please note I recommend she have TSH and Free T4 followed each trimester by your office since she is off levothyroxine. Could you please discuss with her whether NST+MARII can be performed in your office beginning at 36 weeks, and arrange this for her if applicable? Alternatively, antepartum surveillance can be scheduled with MFM if needed or preferred by patient.       This patient was seen recently in our  office.  Please see ultrasound report under \"OB Procedures\" tab.  Please don't hesitate to contact our office with any concerns or questions.      Sincerely,      Marine Cardona MD  Attending Physician, Maternal-Fetal Medicine  [facility]    "

## 2024-12-20 NOTE — PROGRESS NOTES
"[facility]: Calli Blair was seen today at 23w0d for fetal echocardiogram.  See ultrasound report under \"OB Procedures\" tab.  Please don't hesitate to contact our office with any concerns or questions.  -Marine Cardona MD    "

## 2024-12-31 ENCOUNTER — OFFICE VISIT (OUTPATIENT)
Dept: PHYSICAL THERAPY | Facility: CLINIC | Age: 34
End: 2024-12-31
Payer: COMMERCIAL

## 2024-12-31 DIAGNOSIS — M62.89 PELVIC FLOOR DYSFUNCTION: Primary | ICD-10-CM

## 2024-12-31 DIAGNOSIS — Z3A.16 16 WEEKS GESTATION OF PREGNANCY: ICD-10-CM

## 2024-12-31 DIAGNOSIS — O09.812 PREGNANCY RESULTING FROM IN VITRO FERTILIZATION, SECOND TRIMESTER: ICD-10-CM

## 2024-12-31 PROCEDURE — 97112 NEUROMUSCULAR REEDUCATION: CPT | Performed by: PHYSICAL THERAPIST

## 2024-12-31 PROCEDURE — 97530 THERAPEUTIC ACTIVITIES: CPT | Performed by: PHYSICAL THERAPIST

## 2024-12-31 NOTE — PROGRESS NOTES
"Discharge     Today's date: 2024  Patient name: Calli Blair  : 1990  MRN: [id number]  Referring provider: Lillie Rodríguez, *  Dx:   Encounter Diagnosis     ICD-10-CM    1. Pelvic floor dysfunction  M62.89       2. Pregnancy resulting from in vitro fertilization, second trimester  O09.812       3. 16 weeks gestation of pregnancy  Z3A.16           Start Time: 0700  Stop Time: 0730  Total time in clinic (min): 30 minutes    Subjective: Patient reports she had a point 2 weeks ago where she was in a lot of pain but went to get a prenatal massage and feels a lot better today.       Objective: See treatment diary below      Assessment: Tolerated treatment well. Patient exhibited good technique with therapeutic exercises. Reviewed HEP and muscle energy techniques to improve muscle synergy and coupling. Reviewed and provided patient with education handouts on SI belt, birthing and breathing positions during delivery, and self perineal massage when appropriate. Patient requests to be discharged at this time to continue exercises per HEP. She was instructed to contact this office with any issues or concerns in the future. She was also educated on possible post-partum PT as well.       Plan:  Discharge to HEP        POC Expires Auth Status Start Date Expiration Date PT Visit Limit    1/3/2025 No Auth Req.   BOMN   Date 12/3/2024 12/10/2024 2024 2024    Used 1 2 3 4    Remaining           Diagnosis: current pregnancy through IVF (due 2025), stress incontinence   Precautions: anxiety/depression   Next Physician's Appt:   [employer] HEP: [id number]    Manuals 12/3 12/10 12/17 12/31    STM  TH: R TS paraspinal TH: Lumbar     PROM                Neuro Re-Ed        Biofeedback        MREs    Bilat hip flex/hip ext x10 ea    PFM Contract  Quadruped: BI/MARIE  10x      Quick Flicks        TA ball press  3\"x15 supine and quad BI/MARIE 10x 3\"x 10 supine     Glute Set  3\"x15 3\"x15 3\"x15    Glut set " "alternating   10x 2\" ea     TA + PFM w/ hip ADD ball squeeze  No TA 3\"x10      TA + PFM w/ BKFO        TA + PFM w/ H/L marches                                Ther Ex        SKTC   5-10\" x10 ea     DKTC w/ ball        LTRs  10x 10x     Butterfly ADD Stretch        MIRELA/ piriformis stretch   MIRELA 10x10\" ea     Happy Baby        Standing adductor stretch        SB pelvic floor circles        Open books  10x ea      Supine FR extensions  10x      T/S seated extensions  1/2 FR 2x10      Hamstring stretch   10x10\" ea              Ther Activity              Bike for LE mobility             PFM w/ STS        TA + PFM w/ lifting/carrying tasks                Diaphragmatic Breathing        Bladder Diary        Pt Ed HEP, POC   POC, HEP    Re-Evaluation        D/C     Modalities             Heat/ice (PRN)                                         "

## 2025-01-03 ENCOUNTER — ROUTINE PRENATAL (OUTPATIENT)
Dept: OBGYN CLINIC | Facility: CLINIC | Age: 35
End: 2025-01-03

## 2025-01-03 VITALS — DIASTOLIC BLOOD PRESSURE: 80 MMHG | WEIGHT: 145 LBS | BODY MASS INDEX: 24.89 KG/M2 | SYSTOLIC BLOOD PRESSURE: 122 MMHG

## 2025-01-03 DIAGNOSIS — O09.812 PREGNANCY RESULTING FROM IN VITRO FERTILIZATION IN SECOND TRIMESTER: Primary | ICD-10-CM

## 2025-01-03 DIAGNOSIS — Z3A.25 25 WEEKS GESTATION OF PREGNANCY: ICD-10-CM

## 2025-01-03 DIAGNOSIS — O99.280 THYROID DISEASE AFFECTING PREGNANCY: ICD-10-CM

## 2025-01-03 DIAGNOSIS — E07.9 THYROID DISEASE AFFECTING PREGNANCY: ICD-10-CM

## 2025-01-03 PROCEDURE — PNV

## 2025-01-03 NOTE — PROGRESS NOTES
Patient is a 35 YO  female presenting to the office at 25w0d for routine OB care.   Patient is feeling well today.   Sub-clinical hypothyroidism, will check TSH to ensure euthyroid off of levothyroxine.  She is following with pelvic floor PT  28-week labs ordered and discussed.   Discussed sleeping positions   US with MFM 2025  Fetal heart rate: 140  BP: 122/80  TWlb  Fetal Movement: yes, good movement. We discussed normal movements in 2nd trimester vs 3rd trimester  LOF: no  VB: no  CTX: no  Reviewed precautions  Call for concerns  RTO 3 weeks

## 2025-01-03 NOTE — PROGRESS NOTES
The patient is here for a 25 week prenatal visit.     No bleeding or cramping.     No nausea, vomiting, headache or dizziness.     Urine dip- protein neg, glucose neg.

## 2025-01-07 DIAGNOSIS — O99.280 THYROID DISEASE AFFECTING PREGNANCY: ICD-10-CM

## 2025-01-07 DIAGNOSIS — O09.811 PREGNANCY RESULTING FROM IN VITRO FERTILIZATION IN FIRST TRIMESTER: ICD-10-CM

## 2025-01-07 DIAGNOSIS — E07.9 THYROID DISEASE AFFECTING PREGNANCY: ICD-10-CM

## 2025-01-07 RX ORDER — ASPIRIN 81 MG/1
162 TABLET ORAL DAILY
Qty: 180 TABLET | Refills: 0 | Status: SHIPPED | OUTPATIENT
Start: 2025-01-07

## 2025-01-08 ENCOUNTER — APPOINTMENT (OUTPATIENT)
Dept: LAB | Facility: CLINIC | Age: 35
End: 2025-01-08
Payer: COMMERCIAL

## 2025-01-08 DIAGNOSIS — E07.9 THYROID DISEASE AFFECTING PREGNANCY: ICD-10-CM

## 2025-01-08 DIAGNOSIS — Z3A.25 25 WEEKS GESTATION OF PREGNANCY: ICD-10-CM

## 2025-01-08 DIAGNOSIS — O09.812 PREGNANCY RESULTING FROM IN VITRO FERTILIZATION IN SECOND TRIMESTER: ICD-10-CM

## 2025-01-08 DIAGNOSIS — O99.280 THYROID DISEASE AFFECTING PREGNANCY: ICD-10-CM

## 2025-01-08 LAB
BASOPHILS # BLD AUTO: 0.02 THOUSANDS/ΜL (ref 0–0.1)
BASOPHILS NFR BLD AUTO: 0 % (ref 0–1)
EOSINOPHIL # BLD AUTO: 0.09 THOUSAND/ΜL (ref 0–0.61)
EOSINOPHIL NFR BLD AUTO: 2 % (ref 0–6)
ERYTHROCYTE [DISTWIDTH] IN BLOOD BY AUTOMATED COUNT: 13.9 % (ref 11.6–15.1)
GLUCOSE 1H P 50 G GLC PO SERPL-MCNC: 105 MG/DL (ref 70–134)
HCT VFR BLD AUTO: 34.6 % (ref 34.8–46.1)
HGB BLD-MCNC: 10.8 G/DL (ref 11.5–15.4)
IMM GRANULOCYTES # BLD AUTO: 0.02 THOUSAND/UL (ref 0–0.2)
IMM GRANULOCYTES NFR BLD AUTO: 0 % (ref 0–2)
LYMPHOCYTES # BLD AUTO: 1.34 THOUSANDS/ΜL (ref 0.6–4.47)
LYMPHOCYTES NFR BLD AUTO: 24 % (ref 14–44)
MCH RBC QN AUTO: 30.3 PG (ref 26.8–34.3)
MCHC RBC AUTO-ENTMCNC: 31.2 G/DL (ref 31.4–37.4)
MCV RBC AUTO: 97 FL (ref 82–98)
MONOCYTES # BLD AUTO: 0.37 THOUSAND/ΜL (ref 0.17–1.22)
MONOCYTES NFR BLD AUTO: 7 % (ref 4–12)
NEUTROPHILS # BLD AUTO: 3.78 THOUSANDS/ΜL (ref 1.85–7.62)
NEUTS SEG NFR BLD AUTO: 67 % (ref 43–75)
NRBC BLD AUTO-RTO: 0 /100 WBCS
PLATELET # BLD AUTO: 241 THOUSANDS/UL (ref 149–390)
PMV BLD AUTO: 11.4 FL (ref 8.9–12.7)
RBC # BLD AUTO: 3.57 MILLION/UL (ref 3.81–5.12)
WBC # BLD AUTO: 5.62 THOUSAND/UL (ref 4.31–10.16)

## 2025-01-08 PROCEDURE — 82950 GLUCOSE TEST: CPT

## 2025-01-08 PROCEDURE — 84443 ASSAY THYROID STIM HORMONE: CPT

## 2025-01-08 PROCEDURE — 86780 TREPONEMA PALLIDUM: CPT

## 2025-01-08 PROCEDURE — 85025 COMPLETE CBC W/AUTO DIFF WBC: CPT

## 2025-01-08 PROCEDURE — 36415 COLL VENOUS BLD VENIPUNCTURE: CPT

## 2025-01-09 ENCOUNTER — RESULTS FOLLOW-UP (OUTPATIENT)
Dept: OBGYN CLINIC | Facility: CLINIC | Age: 35
End: 2025-01-09

## 2025-01-09 LAB
TREPONEMA PALLIDUM IGG+IGM AB [PRESENCE] IN SERUM OR PLASMA BY IMMUNOASSAY: NORMAL
TSH SERPL DL<=0.05 MIU/L-ACNC: 2.14 UIU/ML (ref 0.45–4.5)

## 2025-01-18 DIAGNOSIS — F41.1 GAD (GENERALIZED ANXIETY DISORDER): ICD-10-CM

## 2025-01-31 ENCOUNTER — ROUTINE PRENATAL (OUTPATIENT)
Dept: OBGYN CLINIC | Facility: CLINIC | Age: 35
End: 2025-01-31
Payer: COMMERCIAL

## 2025-01-31 VITALS — DIASTOLIC BLOOD PRESSURE: 68 MMHG | SYSTOLIC BLOOD PRESSURE: 112 MMHG | BODY MASS INDEX: 25.78 KG/M2 | WEIGHT: 150.2 LBS

## 2025-01-31 DIAGNOSIS — O09.813 PREGNANCY RESULTING FROM IN VITRO FERTILIZATION IN THIRD TRIMESTER: Primary | ICD-10-CM

## 2025-01-31 DIAGNOSIS — E07.9 THYROID DISEASE AFFECTING PREGNANCY: ICD-10-CM

## 2025-01-31 DIAGNOSIS — Z23 ENCOUNTER FOR IMMUNIZATION: ICD-10-CM

## 2025-01-31 DIAGNOSIS — Z3A.29 29 WEEKS GESTATION OF PREGNANCY: ICD-10-CM

## 2025-01-31 DIAGNOSIS — O99.280 THYROID DISEASE AFFECTING PREGNANCY: ICD-10-CM

## 2025-01-31 LAB
DME PARACHUTE DELIVERY DATE REQUESTED: NORMAL
DME PARACHUTE ITEM DESCRIPTION: NORMAL
DME PARACHUTE ORDER STATUS: NORMAL
DME PARACHUTE SUPPLIER NAME: NORMAL
DME PARACHUTE SUPPLIER PHONE: NORMAL

## 2025-01-31 PROCEDURE — 90471 IMMUNIZATION ADMIN: CPT | Performed by: OBSTETRICS & GYNECOLOGY

## 2025-01-31 PROCEDURE — PNV: Performed by: OBSTETRICS & GYNECOLOGY

## 2025-01-31 PROCEDURE — 90715 TDAP VACCINE 7 YRS/> IM: CPT | Performed by: OBSTETRICS & GYNECOLOGY

## 2025-01-31 NOTE — PROGRESS NOTES
34 y.o.  female at 29w0d (Estimated Date of Delivery: 25) for PNV.    Pre- Vitals      Flowsheet Row Most Recent Value   Prenatal Assessment    Fetal Heart Rate 130   Movement Present   Prenatal Vitals    Blood Pressure 112/68   Weight - Scale 68.1 kg (150 lb 3.2 oz)   Urine Albumin/Glucose    Dilation/Effacement/Station    Vaginal Drainage    Edema           TW.6 kg (32 lb 3.2 oz)    28 wk labs reviewed.    - , Hgb 10.8, plts 241   - TSH 2.1. Not on medication.   Red folder given and reviewed. Discussed Fetal kick counts, PTL/Labor information, Baby & Me Classes.   Breast pump ordered.  Patient requests Tdap.    Consent signed - ok with transfusion, full code.   Current visitor policy reviewed.   Patient plans to breastfeed.   Skin to skin, rooming in, delayed cord clamp, pain management in labor discussed.    TDAP was given.  Rhogam was not indicated.    Leakage of fluid: no  Vaginal bleeding: no  Contractions/Cramping: no  Fetal movement: yes    Discussed IVF pregnancy and timing of delivery.  We discussed the option for 39-week induction of labor.  Reviewed recommendations regarding timing of induction of labor after 39 weeks secondary to placental efficiency and support throughout the labor process for the fetus.  Discussed increasing placental insufficiency after her due date I would highly recommend to consider delivering by MIGUEL.     RTO in 2 weeks.

## 2025-01-31 NOTE — PATIENT INSTRUCTIONS
The Third Trimester  (28-42 weeks)  YOUR BABY   * your baby sucks its thumb now!   * your baby can hear voices and respond to touch…..so talk to him or her!!   * your baby’s brain grows and develops most in the last 2 months of pregnancy   * baby’s head and bones are soft and flexible so they can fit through the birth canal   * baby’s movements change towards the end of pregnancy because there is less room for kicking and stretching in your belly   * baby’s lungs are not fully developed and completely ready to breathe on their own until the last 3-4 weeks before your due date    YOUR BODY   * your belly is growing a lot now   * it may become more difficult to sleep well at night or to be as active as you usually are   * you may sweat more than usual   * you will become more off-balance……be careful not to fall!!   * you may develop hemorrhoids (which can be painful and make it difficult to sit down)   * the last two months of pregnancy can become very uncomfortable……with backaches, headaches, and heartburn   * you can start to have contractions…….as long as they are irregular and less than 5 per hour, this is a normal part of your body getting ready to have a baby   * your cervix may start to thin out and open up……to get ready for delivery   * you may find yourself needing to “pee” very often…….because baby is pressing on your bladder so much   * you may get out of breathe more quickly than usual      FETAL KICK COUNTS    In the third trimester (after 28 weeks gestation) you should be performing fetal kick counts every day.  Your baby should move at least 10 times in 2 hours during an active time, once a day.    Choose atime of day when your baby is most active.  Try to do this around the same time each day.  Get into a comfortable position and then write down the time your baby first moves.  Count each movement until the baby moves 10 times.  These movements include kicks, punches, nudges, flutters, or rolls.  This  can take anywhere from 5 minutes to 2 hours.  Write down the time you feel the baby's 10th movement.    If 2 hours has passed and your baby has not moved at least 10 times, you should CALL THE OFFICE RIGHT AWAY.  [phone]          PREMATURE LABOR     When to call [phone]:  * I need to call immediately if I have even a small amount of LIQUID leaking from my vagina, with or without contractions.   * I need to call if I am BLEEDING from my vagina.   * I need to call if I am feeling CRAMPING that continues after drinking 2-3 glasses of water and lying down on my side for one hour and that feels like I am having a period.   * I need to call if I feel CONTRACTIONS  more than 4 times in an hour that feels like the baby is “balling up” even after I try drinking 2-3 glasses of water and lying down on my side for an hour.   * I need to call if I notice a change in my vaginal DISCHARGE.   * I need to call if I am feeling PELVIC PRESSURE  that feels like the baby is pushing down into my vagina and lasts more than an hour.   * I need to call if I have LOW BACKACHE which is new and near my tailbone.  It may either come and go several times during an hour or stay there constantly.          PRE-ECLAMPSIA     What is it?   Pre-eclampsia is a serious disease that can occur during pregnancy related to high blood pressures.  It can happen to any woman.     Why should I care?   Women who develop pre-eclampsia have serious risks which can include seizures, stroke, organ damage, premature birth of their baby.  In the very worst cases, it can cause death of the mother and/or their baby.     What should I pay attention to?   Signs and symptoms of pre-eclampsia can include:   * Severe swelling of face or hands    * A headache that will not go away even after you have taken Tylenol   * Seeing spots or changes in eyesight    * Pain in the upper abdomen or shoulder    * New nausea and vomiting (in the second half of pregnancy)    *  Sudden weight gain    * Difficulty breathing     What should I do?   If you experience any of the above symptoms of pre-eclampsia, contact your OB provider.  Finding pre-eclampsia early is important for you and your baby.  Call us at [phone]      BREASTFEEDING     BENEFITS FOR BABIES   * stronger immune systems (less allergies, eczema, asthma, and childhood cancers)   * less diarrhea and constipation or other GI diseases   * fewer colds and ear infections   * better vision and teeth (fewer cavities)   * improves IQ   * lower rates of diabetes and obesity in childhood     BENEFITS FOR MOMS   * promotes faster weight loss after delivery   * lower risk for postpartum depression   * lower risk for breast, uterine, and ovarian cancers   * lower risk for osteoporosis developing with age   * easier than formula - is always right with you, clean, and the right temperature   * less expensive than formula……it’s FREE !!!!     KEYS TO SUCCESSFUL BREASTFEEDING   * keep baby skin-to-skin until after first feeding event   * keep baby in your room with you during your hospital stay after delivery   * avoid any bottle feedings (unless medically necessary)   * limit the use of pacifiers and swaddling   * ask for help if you are having any issues……lactation consultants (who specialize in breastfeeding) are available to help you   * a healthy diet for mom……eating a variety of foods and portions in moderation    THINGS YOU SHOULD KNOW ABOUT BREASTFEEDING   * most medications are considered compatible with breastfeeding by the American Academy of Pediatrics, but you should check with your health care provider or lactation consultant prior to taking a new medication……just to be sure it is safe   * alcohol (beer, wine, liquor) can be passed from mother to baby through breast milk……an occasional, social drink is deemed acceptable by the American Academy of Pediatrics…..more than that should be avoided   * breastfeeding is NOT an  effective method of birth control   * nicotine (in cigarettes) can pass from mother to baby through breast milk…..however, for mothers who smoke, it is still healthier to breastfeed than use formula   * caffeine should be limited to 1-3 cups per day……includes coffee, soda, energy drinks         PERINEAL / VAGINAL MASSAGE    What can I do now to decrease my chances of tearing during delivery?  Massaging around the vaginal opening by you (or your partner), either antepartum (before birth) or during the second stage of labor, can reduce the likelihood of perineal tearing during childbirth.  Likewise, the use of warm packs held on the perineum during the pushing stage of labor can reduce the severity of your tear.  This will happen during the pushing stage of labor.  At home, you can also help reduce the chances of injury that may occur during the birth of your child through perineal massage.    When should I do this?  Starting around or shortly after 34 weeks of pregnancy, you or your partner should provide 5-10 minutes of vaginal massage 1-4 times per week.    How?  Use either almond, coconut, or olive oil and water mixture on 1 or 2 fingers (depending on comfort).  Insert finger(s) 3-5cm into the vagina.  Apply sweeping downward/sideward pressure from 3 to 9 o'clock for 5-10 minutes, 1-4 times per week.          WARNING SIGNS DURING PREGNANCY  Call our office at [phone] if you experience any of the followin. Vaginal bleeding  2. Sharp abdominal pain that does not go away  3. Fever (more than 100.4 and is not relieved by Tylenol)  4. Persistent vomiting lasting greater than 24 hours  5. Chest pain   6. Pain or burning when you urinate  7. Severe headache that doesn't resolve with Tylenol  8. Blurred vision or seeing spots in your vision  9. Sudden swelling of your face or hands  10. Redness, swelling or pain in a leg  11. A sudden weight gain in just a few days  12. Decrease in your baby's movement (after  28 weeks or the 6th month of pregnancy)  13. A loss of watery fluid from your vagina - can be a gush, a trickle or continuous wetness  14. After 20 weeks of pregnancy, rhythmic cramping (greater than 4 per hour) or menstrual like low/pelvic pain          VACCINES IN PREGNANCY    TDAP  Whooping cough (or pertussis) can be serious for anyone, but for your , it can be life-threatning.  Up to 20 babies die each year in the [place] Due to whooping cough.  About half of babies younger than 1 year old who get whooping cough need treatment in the hospital.  The younger the baby is when he or she gets whooping cough, the more likely he or she will need to be treated in a hospital.  When you receive the whooping cough vaccine (Tdap) during your pregnancy, your body will create protective antibodies and pass some of them to your baby before birth.  These antibodies can help protect your baby from getting whooping cough until they are old enough to be vaccinated themselves (usually around 6 months of age).    INFLUENZA  Changes in your immune, heart, and lung functions during pregnancy make you more likely to get seriously ill from the flu.  Catching the flu also increases your chances for serious problems for your developing baby, including premature labor and delivery.  It is recommended that all women who are pregnant during flu season should receive an influenza vaccine.

## 2025-02-03 ENCOUNTER — TELEPHONE (OUTPATIENT)
Dept: OTHER | Facility: HOSPITAL | Age: 35
End: 2025-02-03

## 2025-02-10 LAB
DME PARACHUTE DELIVERY DATE ACTUAL: NORMAL
DME PARACHUTE DELIVERY DATE REQUESTED: NORMAL
DME PARACHUTE ITEM DESCRIPTION: NORMAL
DME PARACHUTE ORDER STATUS: NORMAL
DME PARACHUTE SUPPLIER NAME: NORMAL
DME PARACHUTE SUPPLIER PHONE: NORMAL

## 2025-02-11 ENCOUNTER — ROUTINE PRENATAL (OUTPATIENT)
Dept: OBGYN CLINIC | Facility: CLINIC | Age: 35
End: 2025-02-11

## 2025-02-11 VITALS — WEIGHT: 150 LBS | DIASTOLIC BLOOD PRESSURE: 70 MMHG | BODY MASS INDEX: 25.75 KG/M2 | SYSTOLIC BLOOD PRESSURE: 122 MMHG

## 2025-02-11 DIAGNOSIS — O99.280 THYROID DISEASE AFFECTING PREGNANCY: ICD-10-CM

## 2025-02-11 DIAGNOSIS — O09.813 PREGNANCY RESULTING FROM IN VITRO FERTILIZATION IN THIRD TRIMESTER: Primary | ICD-10-CM

## 2025-02-11 DIAGNOSIS — Z3A.30 30 WEEKS GESTATION OF PREGNANCY: ICD-10-CM

## 2025-02-11 DIAGNOSIS — E07.9 THYROID DISEASE AFFECTING PREGNANCY: ICD-10-CM

## 2025-02-11 PROCEDURE — PNV: Performed by: PHYSICIAN ASSISTANT

## 2025-02-11 NOTE — PROGRESS NOTES
34 y.o.  female at 30w4d (Estimated Date of Delivery: 25) for PNV.    Pre- Vitals      Flowsheet Row Most Recent Value   Prenatal Assessment    Movement Present   Prenatal Vitals    Blood Pressure 122/70   Weight - Scale 68 kg (150 lb)   Urine Albumin/Glucose    Dilation/Effacement/Station    Vaginal Drainage    Edema           TW.5 kg (32 lb)    Leakage of fluid: no  Vaginal bleeding: yes--? Tiny spot BRB in underwear yesterday   No intercourse  No exercise  No cramping  Pt is feeling well  Bowel and bladder have been regular  Contractions/Cramping: no  Fetal movement: yes  US at [facility] next week    RTO in 2 weeks.

## 2025-02-13 ENCOUNTER — PATIENT MESSAGE (OUTPATIENT)
Dept: OBGYN CLINIC | Facility: CLINIC | Age: 35
End: 2025-02-13

## 2025-02-13 ENCOUNTER — NURSE TRIAGE (OUTPATIENT)
Age: 35
End: 2025-02-13

## 2025-02-13 NOTE — TELEPHONE ENCOUNTER
"30w6d reporting one episode of bright red bleeding a little larger than quarter in underwear after working out this morning.  No additional bleeding or spotting noted occurrence.  States the same thing occurred last week(2/6) after working out. Admits to light abdominal work out.     Blood type O+. Posterior placenta  She was examined by Britt at OB visit on 2/11 with no concern noted.     Advised pelvic rest unitl next OB visti 2/25/2025.   Avoid abdominal work out. Report any  additional bleeding or spotting.      ESC to DR Todd  ESC reply: agrees with above recommendations.   Return call to ash Mccurdy no additional recommendations from Dr. Todd    Reason for Disposition   Slight SPOTTING after sexual intercourse (single or brief episode)     After gym work out    Answer Assessment - Initial Assessment Questions  1. ONSET: \"When did this bleeding start?\"          8:45 this morning. Also occurred on Monday.  2. BLEEDING SEVERITY: \"Describe the bleeding that you are having.\" \"How much bleeding is there?\"       One time in underwear a little larger than quarter   3. ABDOMEN PAIN: \"Do you have any pain?\" \"How bad is the pain?\"  (e.g., Scale 0-10; none, mild, moderate, or severe)      deneis  4. PREGNANCY: \"Do you know how many weeks or months pregnant you are?\"       30w6d  5. MIGUEL: \"What date are you expecting to deliver?\"      4/18/2025  6. FETAL MOVEMENT: \"Has the baby's movement decreased or changed significantly from normal?\"      +FM  7. HIGH-RISK PREGNANCY:  \"Have been told by your doctor that you have a high-risk condition that can cause bleeding?\"  (e.g., placenta previa, vasa previa)       IVF- on baby aspring  8. ULTRASOUND: \"Have you had an ultrasound during this pregnancy?\"  Note: To confirm intrauterine pregnancy, placenta location.      *No Answer*  9. HEMODYNAMIC STATUS: \"Are you weak or feeling lightheaded?\" If Yes, ask: \"Can you stand and walk normally?\"       *No Answer*  10. OTHER " "SYMPTOMS: \"What other symptoms are you having with the bleeding?\" (e.g., leaking fluid from vagina, contractions)        *No Answer*    Protocols used: Pregnancy - Vaginal Bleeding Greater Than 20 Weeks EGA-Adult-[place]    "

## 2025-02-13 NOTE — TELEPHONE ENCOUNTER
Regarding: spotting 30w6d  ----- Message from Jessica GREEN sent at 2/13/2025 11:44 AM EST -----  Pt is 30w6d and said she starting spotting today. She did work out not sure if that was the cause of it. She denies any other symptoms. Rh pos.

## 2025-02-14 ENCOUNTER — HOSPITAL ENCOUNTER (OUTPATIENT)
Facility: HOSPITAL | Age: 35
Discharge: HOME | End: 2025-02-14
Attending: OBSTETRICS & GYNECOLOGY | Admitting: OBSTETRICS & GYNECOLOGY
Payer: COMMERCIAL

## 2025-02-14 VITALS
HEIGHT: 64 IN | TEMPERATURE: 98.2 F | RESPIRATION RATE: 18 BRPM | SYSTOLIC BLOOD PRESSURE: 117 MMHG | OXYGEN SATURATION: 95 % | BODY MASS INDEX: 25.61 KG/M2 | WEIGHT: 150 LBS | DIASTOLIC BLOOD PRESSURE: 66 MMHG | HEART RATE: 90 BPM

## 2025-02-14 PROBLEM — N93.9 VAGINAL BLEEDING: Status: ACTIVE | Noted: 2025-02-14

## 2025-02-14 PROCEDURE — 76817 TRANSVAGINAL US OBSTETRIC: CPT

## 2025-02-14 PROCEDURE — 76817 TRANSVAGINAL US OBSTETRIC: CPT | Performed by: OBSTETRICS & GYNECOLOGY

## 2025-02-14 PROCEDURE — NC001 PR NO CHARGE: Performed by: OBSTETRICS & GYNECOLOGY

## 2025-02-14 PROCEDURE — 99213 OFFICE O/P EST LOW 20 MIN: CPT

## 2025-02-14 NOTE — PROGRESS NOTES
L&D Triage Note - OB/GYN  Calli Blair 34 y.o. female MRN: [id number]  Unit/Bed#:  TRIAGE  Encounter: [id number]      ASSESSMENT:    Calli Blair is a 34 y.o.  at 31w0d who was evaluated today in triage due to vaginal bleeding. Microscopy wnl, cervical length wnl, SVE closed. Reassuring work up, the patient does not appear to be in  labor and it is safe to discharge home with precautions.     PLAN:    1) Vaginal bleeding  - Speculum exam: no abnormal discharge, no pooling, no bleeding  - Microscopy wnl  - Cervical length via TVUS: 3.36-3.83 cm  - SVE: closed    2) 31 weeks gestation of pregnancy  - Continue routine prenatal care  - Discharge from OB triage with  labor precautions    - Reviewed rupture of membranes, false vs true labor, decreased fetal movement, and vaginal bleeding   - Discussed that she may have increased spotting after today's exam    - Pt to call provider with any concerns and follow up at her next scheduled prenatal appointment    - Case discussed with Dr. Nicholson    SUBJECTIVE:    Calli Blair 34 y.o.  at 31w0d with an Estimated Date of Delivery: 25 presenting with vaginal bleeding. She has had spotting for the last three days that she has been monitoring. She had two episodes of spotting to minimal bleeding (quarter sized spots) of bright red blood. She denies any clots, abdominal pain, cramping, contractions. She denies vaginal itching/irritation. She denies recent intercourse.         This pregnancy has been notable for IVF pregnancy.    Contractions: denies  Leakage of fluid: Denies  Vaginal Bleeding: present, spotting  Fetal movement: present    OBJECTIVE:    Vitals:    25 1600   BP: 117/66   Pulse: 90   Resp: 18   Temp: 98.2 °F (36.8 °C)   SpO2: 95%       ROS:  Constitutional: Negative  Respiratory: Negative  Cardiovascular: Negative    Gastrointestinal: Negative    General Physical Exam:  General: Well appearing, no distress  Respiratory:  Unlabored breathing  Cardiovascular: Regular rate.  Abdomen: Soft, gravid, nontender  Fundal Height: Appropriate for gestational age.  Extremities: Warm and well perfused.  Non tender.      Fetal monitoring:  Fetal heart rate: Baseline Rate (FHR): 135 bpm  Variability: Moderate  Accelerations: 10 x 10 (<32 weeks), At variable times  Decelerations: None  Tangent: Contraction Frequency (minutes): 2-4  Contraction Duration (seconds):   Contraction Intensity: Mild      Cervical Exam  Speculum: Cervical os is closed. No abnormal discharge, no bleeding, no lesions, no pooling    KOH/WTMT:     Infection:   - no clue cells    - no hyphae   - no trichomonads present    Membrane status   - no ferning   - negative nitrazene   - no pooling       SVE: closed      Imaging:  TAUS: no evidence of placental abruption on bedside US, breech presentation        TVUS   - Cervical length    - 3.83 cm    - 3.38 cm    - 3.54 cm                  Jessica Jain MD  OBGYN, PGY-I  02/14/25  5:45 PM

## 2025-02-14 NOTE — TELEPHONE ENCOUNTER
31w0d  OB patient calling in again to report additional bright red vaginal bleeding. States it happened yesterday at 9:30 am, which she was informed to monitor. Happened a second time yesterday at 5 pm. Today reports quarter-sized bright red vaginal bleeding at 2:30 pm. O positive blood type. Denies abdominal pain or leakage of fluid. Patient expresses concern for lack of fetal movement. Last felt movement 1 hour ago. States baby meeting kick counts, however kicks feel less intense compared to normal. Advised she leave now to &BRY[facility] Buckner for eval. Patient verbalized understanding.  ETA ~ 20 mins.   ESC sent to on-call Dr. Nicholson and charge RN as fer.  Reason for Disposition   MILD-MODERATE vaginal bleeding (i.e., small to medium clots; like mild menstrual period)  (Exception: Single episode after sexual intercourse or pelvic exam.)   Baby moving less today (e.g., kick count < 5 in 1 hour or < 10 in 2 hours) and 23 or more weeks pregnant    Protocols used: Pregnancy - Vaginal Bleeding Greater Than 20 Weeks EGA-Adult-[place]

## 2025-02-21 ENCOUNTER — ULTRASOUND (OUTPATIENT)
Facility: HOSPITAL | Age: 35
End: 2025-02-21
Attending: OBSTETRICS & GYNECOLOGY
Payer: COMMERCIAL

## 2025-02-21 VITALS
DIASTOLIC BLOOD PRESSURE: 58 MMHG | BODY MASS INDEX: 25.81 KG/M2 | WEIGHT: 151.2 LBS | HEART RATE: 92 BPM | SYSTOLIC BLOOD PRESSURE: 100 MMHG | HEIGHT: 64 IN

## 2025-02-21 DIAGNOSIS — O09.813 PREGNANCY RESULTING FROM IN VITRO FERTILIZATION IN THIRD TRIMESTER: ICD-10-CM

## 2025-02-21 DIAGNOSIS — Z36.89 ENCOUNTER FOR ULTRASOUND TO ASSESS FETAL GROWTH: ICD-10-CM

## 2025-02-21 DIAGNOSIS — N93.9 VAGINAL BLEEDING: ICD-10-CM

## 2025-02-21 DIAGNOSIS — Z3A.32 32 WEEKS GESTATION OF PREGNANCY: Primary | ICD-10-CM

## 2025-02-21 PROCEDURE — 99214 OFFICE O/P EST MOD 30 MIN: CPT | Performed by: OBSTETRICS & GYNECOLOGY

## 2025-02-21 PROCEDURE — 76816 OB US FOLLOW-UP PER FETUS: CPT | Performed by: OBSTETRICS & GYNECOLOGY

## 2025-02-21 NOTE — LETTER
"   Date: 2025    Jannie Todd MD   [facility] [id number]    Patient: Calli Blair   YOB: 1990   Date of Visit: 2025   Gestational age 32w0d   Nature of this communication: Routine though please note Breech presentation at 32w0d. She will need weekly NST+MARII at 36 weeks for IVF and would like to complete with your office if possible. If vaginal bleeding recurs I would recommend 2x/weekly NST with weekly MARII in addition to clinical evaluation. If you prefer her to schedule antepartum surveillance with MFM could you please advise her at upcoming visit?       This patient was seen recently in our  office.  Please see ultrasound report under \"OB Procedures\" tab.  Please don't hesitate to contact our office with any concerns or questions.      Sincerely,      Marine Cardona MD  Attending Physician, Maternal-Fetal Medicine  [facility]    "

## 2025-02-21 NOTE — PROGRESS NOTES
"[facility]: Calli Blair was seen today at 32w0d for fetal growth assessment ultrasound.  See ultrasound report under \"OB Procedures\" tab.  Please don't hesitate to contact our office with any concerns or questions.  -Marine Cardona MD    "

## 2025-02-25 ENCOUNTER — ROUTINE PRENATAL (OUTPATIENT)
Dept: OBGYN CLINIC | Facility: CLINIC | Age: 35
End: 2025-02-25

## 2025-02-25 VITALS — DIASTOLIC BLOOD PRESSURE: 82 MMHG | BODY MASS INDEX: 26.26 KG/M2 | WEIGHT: 153 LBS | SYSTOLIC BLOOD PRESSURE: 120 MMHG

## 2025-02-25 DIAGNOSIS — F32.A DEPRESSION AFFECTING PREGNANCY IN FIRST TRIMESTER, ANTEPARTUM: ICD-10-CM

## 2025-02-25 DIAGNOSIS — Z3A.32 32 WEEKS GESTATION OF PREGNANCY: ICD-10-CM

## 2025-02-25 DIAGNOSIS — O99.341 DEPRESSION AFFECTING PREGNANCY IN FIRST TRIMESTER, ANTEPARTUM: ICD-10-CM

## 2025-02-25 DIAGNOSIS — O09.813 PREGNANCY RESULTING FROM IN VITRO FERTILIZATION IN THIRD TRIMESTER: Primary | ICD-10-CM

## 2025-02-25 PROCEDURE — PNV: Performed by: OBSTETRICS & GYNECOLOGY

## 2025-02-25 NOTE — PROGRESS NOTES
34 y.o.   female at 32.4 wga for PNV. BP : 120/82. TW  + FM, - LOF, - Ctxn, - bleeding  Feeling well. Well not complaints  IVF - NST/AFIs at 36wks  Breech - discussed poss ECV  Reviewed PTL precautions and FKCs  F/u 2 weeks

## 2025-02-25 NOTE — PROGRESS NOTES
34 y.o.  female at 32w4d (Estimated Date of Delivery: 25) for PNV.      TWG: 15.1 kg (33 lb 3.2 oz)    Leakage of fluid: no  Vaginal bleeding: no  Contractions/Cramping: no  Fetal movement: yes'    No nausea, vomiting, headache or dizziness.    Urine dip- protein neg, glucose neg.     RTO in 2 weeks.

## 2025-03-07 ENCOUNTER — TELEPHONE (OUTPATIENT)
Dept: OBGYN CLINIC | Facility: CLINIC | Age: 35
End: 2025-03-07

## 2025-03-14 ENCOUNTER — ROUTINE PRENATAL (OUTPATIENT)
Dept: OBGYN CLINIC | Facility: CLINIC | Age: 35
End: 2025-03-14

## 2025-03-14 VITALS — SYSTOLIC BLOOD PRESSURE: 132 MMHG | BODY MASS INDEX: 25.99 KG/M2 | DIASTOLIC BLOOD PRESSURE: 72 MMHG | WEIGHT: 151.4 LBS

## 2025-03-14 DIAGNOSIS — O09.813 PREGNANCY RESULTING FROM IN VITRO FERTILIZATION IN THIRD TRIMESTER: ICD-10-CM

## 2025-03-14 DIAGNOSIS — O99.341 DEPRESSION AFFECTING PREGNANCY IN FIRST TRIMESTER, ANTEPARTUM: ICD-10-CM

## 2025-03-14 DIAGNOSIS — Z3A.35 35 WEEKS GESTATION OF PREGNANCY: ICD-10-CM

## 2025-03-14 DIAGNOSIS — F32.A DEPRESSION AFFECTING PREGNANCY IN FIRST TRIMESTER, ANTEPARTUM: ICD-10-CM

## 2025-03-14 PROCEDURE — PNV: Performed by: OBSTETRICS & GYNECOLOGY

## 2025-03-14 NOTE — PROGRESS NOTES
34 y.o.  female at 35w0d (Estimated Date of Delivery: 25) for PNV.    Pre- Vitals      Flowsheet Row Most Recent Value   Prenatal Assessment    Fetal Heart Rate 155   Movement Present   Presentation Breech   Prenatal Vitals    Blood Pressure 132/72   Weight - Scale 68.7 kg (151 lb 6.4 oz)   Urine Albumin/Glucose    Dilation/Effacement/Station    Vaginal Drainage    Edema           TWG: 15.2 kg (33 lb 6.4 oz)    Leakage of fluid: no  Vaginal bleeding: no  Contractions/Cramping: no  Fetal movement: yes  Breech presentation - discussed options at prior appointment regarding ECV vs planned 1LTCS. Patient does not want to pursue ECV. Will continue doing spinning babies positions and acupuncture.    - will scheduled 39wk 1LTCS.   External hemorrhoid - discussed external ointments and treatments for symptom relief. Provided precautions for thrombosed hemorrhoid.   GBS/GC at next visit.   RTO in 2 weeks.

## 2025-03-21 ENCOUNTER — OFFICE VISIT (OUTPATIENT)
Dept: FAMILY MEDICINE CLINIC | Facility: CLINIC | Age: 35
End: 2025-03-21
Payer: COMMERCIAL

## 2025-03-21 VITALS
RESPIRATION RATE: 16 BRPM | TEMPERATURE: 98.1 F | OXYGEN SATURATION: 99 % | WEIGHT: 152 LBS | SYSTOLIC BLOOD PRESSURE: 100 MMHG | HEIGHT: 64 IN | BODY MASS INDEX: 25.95 KG/M2 | HEART RATE: 80 BPM | DIASTOLIC BLOOD PRESSURE: 64 MMHG

## 2025-03-21 DIAGNOSIS — K64.9 HEMORRHOIDS, UNSPECIFIED HEMORRHOID TYPE: ICD-10-CM

## 2025-03-21 DIAGNOSIS — Z00.00 ANNUAL PHYSICAL EXAM: Primary | ICD-10-CM

## 2025-03-21 DIAGNOSIS — F41.1 GAD (GENERALIZED ANXIETY DISORDER): ICD-10-CM

## 2025-03-21 PROCEDURE — 99395 PREV VISIT EST AGE 18-39: CPT | Performed by: FAMILY MEDICINE

## 2025-03-21 NOTE — PROGRESS NOTES
Adult Annual Physical  Name: Calli Blair      : 1990      MRN: [id number]  Encounter Provider: Eric Olmedo MD  Encounter Date: 3/21/2025   Encounter department: [facility]    Assessment & Plan  Annual physical exam  UTD for age appropriate screening. FBW ordered for caring starts with you       Hemorrhoids, unspecified hemorrhoid type  Resolved       YOSEF (generalized anxiety disorder)  Stable on Zoloft 50 mg daily        Preventive Screenings:  - Diabetes Screening: screening up-to-date  - Cholesterol Screening: orders placed   - Hepatitis C screening: screening up-to-date   - HIV screening: screening up-to-date   - Colon cancer screening: screening not indicated   - Lung cancer screening: screening not indicated   - Prostate cancer screening: screening not indicated     Counseling/Anticipatory Guidance:    - Dental health: discussed importance of regular tooth brushing, flossing, and dental visits.   - Diet: discussed recommendations for a healthy/well-balanced diet.   - Exercise: the importance of regular exercise/physical activity was discussed. Recommend exercise 3-5 times per week for at least 30 minutes.       Depression Screening and Follow-up Plan: Patient was screened for depression during today's encounter. They screened negative with a PHQ-9 score of 2.      History of Present Illness     Adult Annual Physical:  Patient presents for annual physical.   36 weeks GA  Pregnancy has gone well overall  May need a c section since baby s now breech   Had a hemorrhoid last week that was treated with topical medications  This bustamante resolved.     Diet and Physical Activity:  - Diet/Nutrition: well balanced diet.  - Exercise: walking, strength training exercises and 3-4 times a week on average.    Depression Screening:    - PHQ-9 Score: 2    General Health:  - Sleep: 7-8 hours of sleep on average. RLS and occasional insomnia. Mg has helped  - Hearing: normal hearing right ear and  "normal hearing left ear.  - Vision: no vision problems.  - Dental: regular dental visits. Seen a few months ago    /GYN Health:  - Follows with GYN: yes.   - Menopause: premenopausal.   - Last menstrual cycle: 7/16/2024.   - History of STDs: no    Advanced Care Planning:  - Has an advanced directive?: no    - Has a durable medical POA?: no      Review of Systems      Objective   /64 (BP Location: Left arm, Patient Position: Sitting, Cuff Size: Large)   Pulse 80   Temp 98.1 °F (36.7 °C) (Temporal)   Resp 16   Ht 5' 4\" (1.626 m)   Wt 68.9 kg (152 lb)   LMP 01/18/2024 (Exact Date)   SpO2 99%   BMI 26.09 kg/m²     Physical Exam  Constitutional:       General: She is not in acute distress.     Appearance: Normal appearance. She is not ill-appearing or toxic-appearing.   HENT:      Head: Normocephalic and atraumatic.      Right Ear: Tympanic membrane normal.      Left Ear: Tympanic membrane normal.      Nose: No congestion.      Mouth/Throat:      Mouth: Mucous membranes are moist.   Eyes:      Extraocular Movements: Extraocular movements intact.   Cardiovascular:      Rate and Rhythm: Normal rate and regular rhythm.      Heart sounds: No murmur heard.  Pulmonary:      Effort: Pulmonary effort is normal. No respiratory distress.      Breath sounds: Normal breath sounds. No stridor. No wheezing, rhonchi or rales.   Musculoskeletal:      Right lower leg: No edema.      Left lower leg: No edema.   Lymphadenopathy:      Cervical: No cervical adenopathy.   Neurological:      Mental Status: She is alert and oriented to person, place, and time.   Psychiatric:         Mood and Affect: Mood normal.         Behavior: Behavior normal.       "

## 2025-03-28 ENCOUNTER — ROUTINE PRENATAL (OUTPATIENT)
Dept: OBGYN CLINIC | Facility: CLINIC | Age: 35
End: 2025-03-28
Payer: COMMERCIAL

## 2025-03-28 VITALS — WEIGHT: 153 LBS | SYSTOLIC BLOOD PRESSURE: 116 MMHG | BODY MASS INDEX: 26.26 KG/M2 | DIASTOLIC BLOOD PRESSURE: 70 MMHG

## 2025-03-28 DIAGNOSIS — Z3A.37 37 WEEKS GESTATION OF PREGNANCY: Primary | ICD-10-CM

## 2025-03-28 PROCEDURE — 87150 DNA/RNA AMPLIFIED PROBE: CPT | Performed by: OBSTETRICS & GYNECOLOGY

## 2025-03-28 PROCEDURE — 87491 CHLMYD TRACH DNA AMP PROBE: CPT | Performed by: OBSTETRICS & GYNECOLOGY

## 2025-03-28 PROCEDURE — PNV: Performed by: OBSTETRICS & GYNECOLOGY

## 2025-03-28 PROCEDURE — 59025 FETAL NON-STRESS TEST: CPT | Performed by: OBSTETRICS & GYNECOLOGY

## 2025-03-28 PROCEDURE — 87591 N.GONORRHOEAE DNA AMP PROB: CPT | Performed by: OBSTETRICS & GYNECOLOGY

## 2025-03-28 NOTE — PROGRESS NOTES
34 y.o.  female at 37w0d (Estimated Date of Delivery: 25) for PNV.    Pre- Vitals      Flowsheet Row Most Recent Value   Prenatal Assessment    Fetal Heart Rate 120   Movement Present   Presentation Breech   Prenatal Vitals    Blood Pressure 116/70   Weight - Scale 69.4 kg (153 lb)   Urine Albumin/Glucose    Dilation/Effacement/Station    Vaginal Drainage    Edema           TWG: 15.9 kg (35 lb)    Leakage of fluid: no  Vaginal bleeding: no  Contractions/Cramping: no  Fetal movement: yes    GBS done: Yes  PCN allergy: Yes  Chlamydia/gonorrhea swab done: Yes  Delivery plan: scheduled 1 LTCS , breech.  Given Hibiclens.   Labor precautions given.  FKC reviewed.     NST today:    Nonstress Test  Reason for NST: IVF  Variability: Moderate  Maternal Pulse: 84  Decelerations: None  Accelerations: Yes  Baseline: 120 BPM  Uterine Irritability: Yes  Contractions: Irregular  Fluid appears adequate.     RTO in 1 weeks.

## 2025-03-30 LAB
C TRACH DNA SPEC QL NAA+PROBE: NEGATIVE
GP B STREP DNA SPEC QL NAA+PROBE: NEGATIVE
N GONORRHOEA DNA SPEC QL NAA+PROBE: NEGATIVE

## 2025-03-31 ENCOUNTER — RESULTS FOLLOW-UP (OUTPATIENT)
Dept: OBGYN CLINIC | Facility: CLINIC | Age: 35
End: 2025-03-31

## 2025-04-04 ENCOUNTER — ROUTINE PRENATAL (OUTPATIENT)
Dept: OBGYN CLINIC | Facility: CLINIC | Age: 35
End: 2025-04-04
Payer: COMMERCIAL

## 2025-04-04 VITALS — DIASTOLIC BLOOD PRESSURE: 76 MMHG | WEIGHT: 153 LBS | SYSTOLIC BLOOD PRESSURE: 110 MMHG | BODY MASS INDEX: 26.26 KG/M2

## 2025-04-04 DIAGNOSIS — Z3A.38 38 WEEKS GESTATION OF PREGNANCY: ICD-10-CM

## 2025-04-04 DIAGNOSIS — O09.813 PREGNANCY RESULTING FROM IN VITRO FERTILIZATION IN THIRD TRIMESTER: Primary | ICD-10-CM

## 2025-04-04 PROCEDURE — 59025 FETAL NON-STRESS TEST: CPT | Performed by: OBSTETRICS & GYNECOLOGY

## 2025-04-04 PROCEDURE — 76815 OB US LIMITED FETUS(S): CPT | Performed by: OBSTETRICS & GYNECOLOGY

## 2025-04-04 PROCEDURE — PNV: Performed by: OBSTETRICS & GYNECOLOGY

## 2025-04-04 NOTE — PROGRESS NOTES
34 y.o.  female at 38w0d (Estimated Date of Delivery: 25) for PNV.    Pre- Vitals      Flowsheet Row Most Recent Value   Prenatal Assessment    Movement Present   Prenatal Vitals    Blood Pressure 110/76   Weight - Scale 69.4 kg (153 lb)   Urine Albumin/Glucose    Dilation/Effacement/Station    Vaginal Drainage    Edema           TWG: 15.9 kg (35 lb)    Leakage of fluid: no  Vaginal bleeding: no  Contractions/Cramping: no  Fetal movement: yes    NST today:    Nonstress Test  Reason for NST: IVF  Variability: Moderate  Decelerations: None  Accelerations: Yes  Acoustic Stimulator: No  Baseline: 130 BPM  MARII (If Indicated) (cm): 10 cm  Uterine Irritability: No  Contractions: Irregular  Contraction Frequency (minutes): 6 min    Breech by US.   CS next week.     RTO in 1 weeks.

## 2025-04-04 NOTE — PROGRESS NOTES
Chante Leakage     Chante Bleeding     Pt has light cramping but chante contractions / B.H.    Pt would not like her cervix checked.

## 2025-04-10 PROBLEM — Z3A.39 39 WEEKS GESTATION OF PREGNANCY: Status: ACTIVE | Noted: 2024-09-30

## 2025-04-10 NOTE — H&P
H & P- Obstetrics   Calli Blair 34 y.o. female MRN: [id number]  Unit/Bed#:  TRIAGE 3- Encounter: [id number]      Assessment/Plan:    Calli is a 34 y.o.  at 38w6d admitted for scheduled 1LTCS in setting of breech presentation.  Assessment & Plan  39 weeks gestation of pregnancy  Anesthesia consult for regional anesthesia  Ancef 2g IV for surgical prophylaxis  FU CBC, Syphilis screening, Blood Type & Screen  Contraception: IVF pregnancy, declines contra  Breech presentation, no version  TAUS this morning confirms breech presentation   Pregnancy resulting from in vitro fertilization in third trimester  Underwent weekly NST/MARII starting at 36 weeks  Depression affecting pregnancy in first trimester, antepartum  Takes Zoloft 50mg qd        Patient of: [facility]  This patient will be an INPATIENT  and I certify the anticipated length of stay is >2 Midnights  Discussed with Dr. Clancy      SUBJECTIVE:    Chief Complaint: I am here for my     HPI: Calli Blair is a 34 y.o.  with an MIGUEL of 2025, by Embryo Transfer at 38w6d who is being admitted for scheduled 1LTCS in setting of breech presentation. She denies having uterine contractions, has no LOF, and reports no VB. She states that the baby moves as usual. This pregnancy is complicated by depression and subclinical hypothyroidism. She denies chest pain, SOB, abdominal pain, nausea/vomiting, lower extremity pain. All other review of systems is negative.       Pregnancy Plan:  Pregnancy: Ferguson  Fetal sex: Wingo  Support person: Gregor Smyth     Delivery Plans  Planned delivery method: Vaginal  Planned delivery location: [facility] L&D  Planned anesthesia: Epidural  Acceptable blood products: All     Post-Delivery Plans  Feeding intentions: Breast Milk  Circumcision requested: Provider Performed      Patient Active Problem List   Diagnosis    Pregnancy resulting from in vitro fertilization in third trimester    Subclinical  hypothyroidism    39 weeks gestation of pregnancy    Thyroid disease affecting pregnancy    Depression affecting pregnancy in first trimester, antepartum    Vaginal bleeding    Breech presentation, no version       OB History    Para Term  AB Living   1        SAB IAB Ectopic Multiple Live Births             # Outcome Date GA Lbr Jon/2nd Weight Sex Type Anes PTL Lv   1 Current               Obstetric Comments   Menarche 12        Past Medical History:   Diagnosis Date    Anxiety     No pertinent past medical history     Varicella     in childhood, vaccinated       Past Surgical History:   Procedure Laterality Date    NO PAST SURGERIES         Social History     Tobacco Use    Smoking status: Never     Passive exposure: Never    Smokeless tobacco: Never   Substance Use Topics    Alcohol use: Not Currently     Alcohol/week: 2.0 standard drinks of alcohol     Types: 1 Glasses of wine, 1 Cans of beer per week     Comment: socially-prior to pregnancy       Allergies   Allergen Reactions    Amoxicillin Rash     Rash- in childhood         Medications Prior to Admission:     Cholecalciferol (Vitamin D3) 75 MCG (3000 UT) TABS    magnesium oxide-pyridoxine (BEELITH) 362-20 MG TABS    Prenatal MV-Min-Fe Fum-FA-DHA (PRENATAL 1 PO)    sertraline (ZOLOFT) 50 mg tablet        OBJECTIVE:  Vitals:  Temp:  [98.7 °F (37.1 °C)] 98.7 °F (37.1 °C)  HR:  [75] 75  BP: (121)/(72) 121/72  Resp:  [16] 16  Body mass index is 26.26 kg/m².     Physical Exam:  General: Well appearing, no distress  Respiratory: Unlabored breathing, clear to auscultation bilaterally  Cardiovascular: Regular rate and rhythm, no murmurs  Abdomen: Soft, gravid, nontender  Fundal Height: Appropriate for gestational age.  Extremities: Warm and well perfused.  Non tender.  Psychiatric: Behavioral normal        FHT:  Baseline: 130bpm  Moderate variability  15x15 accelerations  No decelerations  NST reactive    TOCO:    Contractions q5-6 min      Prenatal  "Labs: I have personally reviewed pertinent reports.  , Blood Type:   Lab Results   Component Value Date/Time    ABO Grouping O 09/27/2024 02:02 PM     , D (Rh type):   Lab Results   Component Value Date/Time    Rh Factor Positive 09/27/2024 02:02 PM     , Antibody Screen:Negative  , HCT/HGB:   Lab Results   Component Value Date/Time    Hematocrit 34.6 (L) 01/08/2025 09:01 AM    Hemoglobin 10.8 (L) 01/08/2025 09:01 AM      , MCV:   Lab Results   Component Value Date/Time    MCV 97 01/08/2025 09:01 AM      , Platelets:   Lab Results   Component Value Date/Time    Platelets 241 01/08/2025 09:01 AM      , 1 hour Glucola:   Lab Results   Component Value Date/Time    Glucose 105 01/08/2025 09:01 AM   , Varicella:   Lab Results   Component Value Date/Time    Varicella IgG IMMUNE 09/27/2024 02:02 PM       , Rubella:   Lab Results   Component Value Date/Time    Rubella IgG Quant 34.0 09/27/2024 02:02 PM        , VDRL/RPR: Non-reactive    , Urine Culture/Screen:   Lab Results   Component Value Date/Time    Urine Culture No Growth <1000 cfu/mL 09/27/2024 02:02 PM       , Hep B:   Lab Results   Component Value Date/Time    Hepatitis B Surface Ag Non-reactive 09/27/2024 02:02 PM     , Hep C:   Lab Results   Component Value Date/Time    Hepatitis C Ab Non-reactive 09/27/2024 02:02 PM      , HIV: Non-reactive   , Chlamydia: Negative  , Gonorrhea:   Lab Results   Component Value Date/Time    N gonorrhoeae, DNA Probe Negative 03/28/2025 03:34 PM     , Group B Strep:    Lab Results   Component Value Date/Time    Strep Grp B PCR Negative 03/28/2025 03:34 PM            Janna Gutiérrez MD  4/11/2025  6:46 AM        Portions of the record may have been created with voice recognition software.  Occasional wrong word or \"sound a like\" substitutions may have occurred due to the inherent limitations of voice recognition software.  Read the chart carefully and recognize, using context, where substitutions have occurred    "

## 2025-04-10 NOTE — ASSESSMENT & PLAN NOTE
Anesthesia consult for regional anesthesia  Ancef 2g IV for surgical prophylaxis  FU CBC, Syphilis screening, Blood Type & Screen  Contraception: IVF pregnancy, declines contra

## 2025-04-10 NOTE — PRE-PROCEDURE INSTRUCTIONS
Spoke to patient for pre-operative instructions prior to .   Pt was instructed to arrive 2 hours before her scheduled OR time (0600 arrival time)     Pt instructed to remain NPO after midnight.              *This includes gum, water and hard candy.  *If patient takes AM meds (e.g.hypertensive meds) they may take these meds with a sip of water.    *This should not include medications like prenatal vitamins, aspirin, or colace.   *Type 1 diabetics should take their insulin as normal. Type 2/A2GDM should take a half dose of nighttime insulin.    Pt should brush their teeth as usual.     Pt was instructed to buy and use a pre-surgical wash containing chlorhexidine the night before and the morning of her scheduled  and to wear clean clothing after the wash.  Pt instructed not to shave operative area prior to surgery.     Pt was asked not to wear any jewelry and to leave all of her valuables at home.     Pt was asked to leave all of her larger bags and suitcases in the car to be brought in after she is assigned a post partum room.  Pt should bring in any paperwork she was given in the office.     Pt was informed that she may have 1 support person in the OR/PACU area and of the current visiting policies.  Support person should eat prior to the surgery.

## 2025-04-11 ENCOUNTER — HOSPITAL ENCOUNTER (INPATIENT)
Facility: HOSPITAL | Age: 35
LOS: 3 days | Discharge: HOME | End: 2025-04-14
Attending: OBSTETRICS & GYNECOLOGY | Admitting: OBSTETRICS & GYNECOLOGY
Payer: COMMERCIAL

## 2025-04-11 ENCOUNTER — ANESTHESIA EVENT (INPATIENT)
Dept: LABOR AND DELIVERY | Facility: HOSPITAL | Age: 35
End: 2025-04-11
Payer: COMMERCIAL

## 2025-04-11 ENCOUNTER — ANESTHESIA (INPATIENT)
Dept: LABOR AND DELIVERY | Facility: HOSPITAL | Age: 35
End: 2025-04-11
Payer: COMMERCIAL

## 2025-04-11 DIAGNOSIS — Z98.891 S/P PRIMARY LOW TRANSVERSE C-SECTION: ICD-10-CM

## 2025-04-11 LAB
ABO GROUP BLD: NORMAL
BASE EXCESS BLDCOA CALC-SCNC: -4.9 MMOL/L (ref 3–11)
BASE EXCESS BLDCOV CALC-SCNC: -4.8 MMOL/L (ref 1–9)
BLD GP AB SCN SERPL QL: NEGATIVE
ERYTHROCYTE [DISTWIDTH] IN BLOOD BY AUTOMATED COUNT: 15.2 % (ref 11.6–15.1)
HCO3 BLDCOA-SCNC: 21.8 MMOL/L (ref 17.3–27.3)
HCO3 BLDCOV-SCNC: 19 MMOL/L (ref 12.2–28.6)
HCT VFR BLD AUTO: 35 % (ref 34.8–46.1)
HGB BLD-MCNC: 10.9 G/DL (ref 11.5–15.4)
HOLD SPECIMEN: NORMAL
MCH RBC QN AUTO: 26.8 PG (ref 26.8–34.3)
MCHC RBC AUTO-ENTMCNC: 31.1 G/DL (ref 31.4–37.4)
MCV RBC AUTO: 86 FL (ref 82–98)
O2 CT VFR BLDCOA CALC: 2.8 ML/DL
OXYHGB MFR BLDCOA: 14.5 %
OXYHGB MFR BLDCOV: 58.7 %
PCO2 BLDCOA: 46.5 MM[HG] (ref 30–60)
PCO2 BLDCOV: 32 MM HG (ref 27–43)
PH BLDCOA: 7.29 [PH] (ref 7.23–7.43)
PH BLDCOV: 7.39 [PH] (ref 7.19–7.49)
PLATELET # BLD AUTO: 216 THOUSANDS/UL (ref 149–390)
PMV BLD AUTO: 11.1 FL (ref 8.9–12.7)
PO2 BLDCOA: 11.2 MM HG (ref 5–25)
PO2 BLDCOV: 24 MM HG (ref 15–45)
RBC # BLD AUTO: 4.06 MILLION/UL (ref 3.81–5.12)
RH BLD: POSITIVE
SAO2 % BLDCOV: 12 ML/DL
SPECIMEN EXPIRATION DATE: NORMAL
TREPONEMA PALLIDUM IGG+IGM AB [PRESENCE] IN SERUM OR PLASMA BY IMMUNOASSAY: NORMAL
WBC # BLD AUTO: 5.82 THOUSAND/UL (ref 4.31–10.16)

## 2025-04-11 PROCEDURE — 86900 BLOOD TYPING SEROLOGIC ABO: CPT

## 2025-04-11 PROCEDURE — 86850 RBC ANTIBODY SCREEN: CPT

## 2025-04-11 PROCEDURE — 94760 N-INVAS EAR/PLS OXIMETRY 1: CPT

## 2025-04-11 PROCEDURE — 86780 TREPONEMA PALLIDUM: CPT

## 2025-04-11 PROCEDURE — 85027 COMPLETE CBC AUTOMATED: CPT

## 2025-04-11 PROCEDURE — 82805 BLOOD GASES W/O2 SATURATION: CPT | Performed by: OBSTETRICS & GYNECOLOGY

## 2025-04-11 PROCEDURE — TCMXX: Performed by: FAMILY MEDICINE

## 2025-04-11 PROCEDURE — 94762 N-INVAS EAR/PLS OXIMTRY CONT: CPT

## 2025-04-11 PROCEDURE — 4A0HXCZ MEASUREMENT OF PRODUCTS OF CONCEPTION, CARDIAC RATE, EXTERNAL APPROACH: ICD-10-PCS | Performed by: OBSTETRICS & GYNECOLOGY

## 2025-04-11 PROCEDURE — 86901 BLOOD TYPING SEROLOGIC RH(D): CPT

## 2025-04-11 PROCEDURE — NC001 PR NO CHARGE: Performed by: OBSTETRICS & GYNECOLOGY

## 2025-04-11 PROCEDURE — 59510 CESAREAN DELIVERY: CPT | Performed by: OBSTETRICS & GYNECOLOGY

## 2025-04-11 RX ORDER — NALOXONE HYDROCHLORIDE 0.4 MG/ML
0.1 INJECTION, SOLUTION INTRAMUSCULAR; INTRAVENOUS; SUBCUTANEOUS
Status: ACTIVE | OUTPATIENT
Start: 2025-04-11 | End: 2025-04-12

## 2025-04-11 RX ORDER — IBUPROFEN 600 MG/1
600 TABLET, FILM COATED ORAL EVERY 6 HOURS
Status: DISCONTINUED | OUTPATIENT
Start: 2025-04-13 | End: 2025-04-14 | Stop reason: HOSPADM

## 2025-04-11 RX ORDER — DEXAMETHASONE SODIUM PHOSPHATE 10 MG/ML
INJECTION, SOLUTION INTRAMUSCULAR; INTRAVENOUS AS NEEDED
Status: DISCONTINUED | OUTPATIENT
Start: 2025-04-11 | End: 2025-04-11

## 2025-04-11 RX ORDER — SIMETHICONE 80 MG
80 TABLET,CHEWABLE ORAL 4 TIMES DAILY PRN
Status: DISCONTINUED | OUTPATIENT
Start: 2025-04-11 | End: 2025-04-14 | Stop reason: HOSPADM

## 2025-04-11 RX ORDER — OXYTOCIN/RINGER'S LACTATE 30/500 ML
62.5 PLASTIC BAG, INJECTION (ML) INTRAVENOUS ONCE
Status: COMPLETED | OUTPATIENT
Start: 2025-04-11 | End: 2025-04-12

## 2025-04-11 RX ORDER — DOCUSATE SODIUM 100 MG/1
100 CAPSULE, LIQUID FILLED ORAL 2 TIMES DAILY
Status: DISCONTINUED | OUTPATIENT
Start: 2025-04-11 | End: 2025-04-14 | Stop reason: HOSPADM

## 2025-04-11 RX ORDER — KETOROLAC TROMETHAMINE 30 MG/ML
INJECTION, SOLUTION INTRAMUSCULAR; INTRAVENOUS AS NEEDED
Status: DISCONTINUED | OUTPATIENT
Start: 2025-04-11 | End: 2025-04-11

## 2025-04-11 RX ORDER — NALBUPHINE HYDROCHLORIDE 10 MG/ML
5 INJECTION INTRAMUSCULAR; INTRAVENOUS; SUBCUTANEOUS
Status: ACTIVE | OUTPATIENT
Start: 2025-04-11 | End: 2025-04-12

## 2025-04-11 RX ORDER — ONDANSETRON 2 MG/ML
4 INJECTION INTRAMUSCULAR; INTRAVENOUS EVERY 8 HOURS PRN
Status: DISCONTINUED | OUTPATIENT
Start: 2025-04-11 | End: 2025-04-11

## 2025-04-11 RX ORDER — BUPIVACAINE HYDROCHLORIDE 7.5 MG/ML
INJECTION, SOLUTION INTRASPINAL AS NEEDED
Status: DISCONTINUED | OUTPATIENT
Start: 2025-04-11 | End: 2025-04-11

## 2025-04-11 RX ORDER — DIPHENHYDRAMINE HYDROCHLORIDE 50 MG/ML
25 INJECTION, SOLUTION INTRAMUSCULAR; INTRAVENOUS EVERY 6 HOURS PRN
Status: DISCONTINUED | OUTPATIENT
Start: 2025-04-11 | End: 2025-04-14 | Stop reason: HOSPADM

## 2025-04-11 RX ORDER — CEFAZOLIN SODIUM 2 G/50ML
2000 SOLUTION INTRAVENOUS ONCE
Status: COMPLETED | OUTPATIENT
Start: 2025-04-11 | End: 2025-04-11

## 2025-04-11 RX ORDER — CALCIUM CARBONATE 500 MG/1
1000 TABLET, CHEWABLE ORAL DAILY PRN
Status: DISCONTINUED | OUTPATIENT
Start: 2025-04-11 | End: 2025-04-14 | Stop reason: HOSPADM

## 2025-04-11 RX ORDER — MORPHINE SULFATE 0.5 MG/ML
INJECTION, SOLUTION EPIDURAL; INTRATHECAL; INTRAVENOUS AS NEEDED
Status: DISCONTINUED | OUTPATIENT
Start: 2025-04-11 | End: 2025-04-11

## 2025-04-11 RX ORDER — ONDANSETRON 2 MG/ML
INJECTION INTRAMUSCULAR; INTRAVENOUS AS NEEDED
Status: DISCONTINUED | OUTPATIENT
Start: 2025-04-11 | End: 2025-04-11

## 2025-04-11 RX ORDER — ACETAMINOPHEN 325 MG/1
650 TABLET ORAL EVERY 6 HOURS SCHEDULED
Status: COMPLETED | OUTPATIENT
Start: 2025-04-11 | End: 2025-04-14

## 2025-04-11 RX ORDER — HYDROMORPHONE HCL/PF 1 MG/ML
0.4 SYRINGE (ML) INJECTION
Status: DISCONTINUED | OUTPATIENT
Start: 2025-04-11 | End: 2025-04-12

## 2025-04-11 RX ORDER — FENTANYL CITRATE/PF 50 MCG/ML
50 SYRINGE (ML) INJECTION
Status: DISCONTINUED | OUTPATIENT
Start: 2025-04-11 | End: 2025-04-12

## 2025-04-11 RX ORDER — ONDANSETRON 2 MG/ML
4 INJECTION INTRAMUSCULAR; INTRAVENOUS ONCE AS NEEDED
Status: DISCONTINUED | OUTPATIENT
Start: 2025-04-11 | End: 2025-04-14 | Stop reason: HOSPADM

## 2025-04-11 RX ORDER — HYDROMORPHONE HCL/PF 1 MG/ML
0.5 SYRINGE (ML) INJECTION EVERY 2 HOUR PRN
Refills: 0 | Status: DISCONTINUED | OUTPATIENT
Start: 2025-04-11 | End: 2025-04-14 | Stop reason: HOSPADM

## 2025-04-11 RX ORDER — BENZOCAINE/MENTHOL 6 MG-10 MG
1 LOZENGE MUCOUS MEMBRANE DAILY PRN
Status: DISCONTINUED | OUTPATIENT
Start: 2025-04-11 | End: 2025-04-14 | Stop reason: HOSPADM

## 2025-04-11 RX ORDER — KETOROLAC TROMETHAMINE 30 MG/ML
30 INJECTION, SOLUTION INTRAMUSCULAR; INTRAVENOUS EVERY 6 HOURS SCHEDULED
Status: COMPLETED | OUTPATIENT
Start: 2025-04-11 | End: 2025-04-12

## 2025-04-11 RX ORDER — ONDANSETRON 2 MG/ML
4 INJECTION INTRAMUSCULAR; INTRAVENOUS EVERY 8 HOURS PRN
Status: DISCONTINUED | OUTPATIENT
Start: 2025-04-11 | End: 2025-04-14 | Stop reason: HOSPADM

## 2025-04-11 RX ORDER — OXYTOCIN/RINGER'S LACTATE 30/500 ML
PLASTIC BAG, INJECTION (ML) INTRAVENOUS CONTINUOUS PRN
Status: DISCONTINUED | OUTPATIENT
Start: 2025-04-11 | End: 2025-04-11

## 2025-04-11 RX ORDER — SODIUM CHLORIDE, SODIUM LACTATE, POTASSIUM CHLORIDE, CALCIUM CHLORIDE 600; 310; 30; 20 MG/100ML; MG/100ML; MG/100ML; MG/100ML
125 INJECTION, SOLUTION INTRAVENOUS CONTINUOUS
Status: DISCONTINUED | OUTPATIENT
Start: 2025-04-11 | End: 2025-04-14 | Stop reason: HOSPADM

## 2025-04-11 RX ADMIN — SODIUM CHLORIDE, SODIUM LACTATE, POTASSIUM CHLORIDE, AND CALCIUM CHLORIDE 125 ML/HR: .6; .31; .03; .02 INJECTION, SOLUTION INTRAVENOUS at 10:19

## 2025-04-11 RX ADMIN — KETOROLAC TROMETHAMINE 30 MG: 30 INJECTION, SOLUTION INTRAMUSCULAR; INTRAVENOUS at 14:16

## 2025-04-11 RX ADMIN — ACETAMINOPHEN 650 MG: 325 TABLET, FILM COATED ORAL at 13:02

## 2025-04-11 RX ADMIN — BUPIVACAINE HYDROCHLORIDE IN DEXTROSE 1.6 ML: 7.5 INJECTION, SOLUTION SUBARACHNOID at 08:11

## 2025-04-11 RX ADMIN — KETOROLAC TROMETHAMINE 30 MG: 30 INJECTION, SOLUTION INTRAMUSCULAR; INTRAVENOUS at 21:46

## 2025-04-11 RX ADMIN — DOCUSATE SODIUM 100 MG: 100 CAPSULE, LIQUID FILLED ORAL at 18:23

## 2025-04-11 RX ADMIN — CEFAZOLIN SODIUM 2000 MG: 2 SOLUTION INTRAVENOUS at 08:09

## 2025-04-11 RX ADMIN — DOCUSATE SODIUM 100 MG: 100 CAPSULE, LIQUID FILLED ORAL at 13:02

## 2025-04-11 RX ADMIN — MORPHINE SULFATE 0.15 MG: 0.5 INJECTION, SOLUTION EPIDURAL; INTRATHECAL; INTRAVENOUS at 08:11

## 2025-04-11 RX ADMIN — KETOROLAC TROMETHAMINE 30 MG: 30 INJECTION, SOLUTION INTRAMUSCULAR; INTRAVENOUS at 08:45

## 2025-04-11 RX ADMIN — SODIUM CHLORIDE, SODIUM LACTATE, POTASSIUM CHLORIDE, AND CALCIUM CHLORIDE 125 ML/HR: .6; .31; .03; .02 INJECTION, SOLUTION INTRAVENOUS at 18:37

## 2025-04-11 RX ADMIN — SODIUM CHLORIDE, SODIUM LACTATE, POTASSIUM CHLORIDE, AND CALCIUM CHLORIDE 1000 ML: .6; .31; .03; .02 INJECTION, SOLUTION INTRAVENOUS at 06:35

## 2025-04-11 RX ADMIN — Medication 250 MILLI-UNITS/MIN: at 08:32

## 2025-04-11 RX ADMIN — ACETAMINOPHEN 650 MG: 325 TABLET, FILM COATED ORAL at 18:23

## 2025-04-11 RX ADMIN — SODIUM CHLORIDE, SODIUM LACTATE, POTASSIUM CHLORIDE, AND CALCIUM CHLORIDE 125 ML/HR: .6; .31; .03; .02 INJECTION, SOLUTION INTRAVENOUS at 07:13

## 2025-04-11 RX ADMIN — DEXAMETHASONE SODIUM PHOSPHATE 10 MG: 10 INJECTION INTRAMUSCULAR; INTRAVENOUS at 08:40

## 2025-04-11 RX ADMIN — SERTRALINE HYDROCHLORIDE 50 MG: 50 TABLET ORAL at 21:46

## 2025-04-11 RX ADMIN — Medication 62.5 MILLI-UNITS/MIN: at 10:18

## 2025-04-11 RX ADMIN — ONDANSETRON 4 MG: 2 INJECTION INTRAMUSCULAR; INTRAVENOUS at 08:40

## 2025-04-11 RX ADMIN — ACETAMINOPHEN 650 MG: 325 TABLET, FILM COATED ORAL at 23:40

## 2025-04-11 RX ADMIN — PHENYLEPHRINE HYDROCHLORIDE 50 MCG/MIN: 50 INJECTION INTRAVENOUS at 08:13

## 2025-04-11 NOTE — DISCHARGE SUMMARY
Discharge Summary - OB/GYN   Name: Calli Blair 34 y.o. female I MRN: [id number]  Unit/Bed#: LD PACU-02 I Date of Admission: 2025   Date of Service: 2025 I Hospital Day: 0    ADMISSION  Patient of: [facility]  Admission Date: 2025   Admitting Attending: Dr. Sanjana Clancy DO  Admitting Diagnoses:   Patient Active Problem List   Diagnosis    Pregnancy resulting from in vitro fertilization in third trimester    Subclinical hypothyroidism    S/P primary low transverse     Thyroid disease affecting pregnancy    Depression affecting pregnancy in first trimester, antepartum    Vaginal bleeding    Breech presentation, no version       DELIVERY  Delivery Method: , Low Transverse   Delivery Date and Time: 2025 8:30 AM  Delivery Attending: Sanjana Clancy    DISCHARGE  Discharge Date: 25  Discharge Attending: Dr. Rose  Discharge Diagnosis:   Same, Delivered    Clinical course: Admission to Delivery  Calli Blair is a 34 y.o.  who was admitted at 39w0d for scheduled 1LTCS in setting of breech presentation. Breech presentation was confirmed on admission. She was taken to the OR for scheduled  section.     Delivery  Route of Delivery: , Low Transverse  Reason for  delivery: Breech      Anesthesia: Spinal,   QBL: 686 mL    Delivery: , Low Transverse at 2025 8:30 AM    Baby's Weight: 3720 g (8 lb 3.2 oz); 131.22    Apgar scores: 8  and 9  at 1 and 5 minutes, respectively    Clinical Course: Post-Delivery:  The post delivery course was unremarkable.    On the day of discharge, the patient was ambulating, voiding spontaneously, tolerating oral intake, and hemodynamically stable. She was able to reasonably perform all ADLs. She had appropriate bowel function. Pain was well-controlled. She was discharged home on postpartum/postop day #3 without complications. Patient was instructed to follow up with her OB as an  outpatient and was given appropriate warnings to call her provider with problems or concerns.    Pertinent lab findings included:   Blood type O+.     Last three Hgb values:  Lab Results   Component Value Date    HGB 10.9 (L) 2025    HGB 10.8 (L) 2025    HGB 12.6 2024        Problem-specific follow-up plans included the following:  Assessment & Plan  S/P primary low transverse   Continue routine post partum care  , HgB 10.9 -> 9.1, venofer ordered  Pain well controlled: tylenol/motrin scheduled, la prn  Lochia within normal limits: continue to monitor   OOB: as able, encourage ambulation  Passing flatus  Voiding spontaneously  DVT ppx: SCDs  Depression affecting pregnancy in first trimester, antepartum  Takes Zoloft 50mg qd      Discharge med list:  Contraception: Declines, IVF pregnancy     Medication List      ASK your doctor about these medications     magnesium oxide-pyridoxine 362-20 MG Tabs; Commonly known as: BEELITH   PRENATAL 1 PO   sertraline 50 mg tablet; Commonly known as: ZOLOFT; Take 1 tablet (50 mg   total) by mouth daily   Vitamin D3 75 MCG (3000 UT) Tabs       Condition at discharge:   stable     Disposition:   Home    Planned Readmission:   No    Discharge Statement:  I have spent a total time of 30 minutes in caring for this patient on the day of the visit/encounter. >30 minutes of time was spent on: Documenting in the medical record and patient care .    Janna Gutiérrez MD   PGY-2, OBGYN  2025  6:43 AM

## 2025-04-11 NOTE — LACTATION NOTE
This note was copied from a baby's chart.  CONSULT - LACTATION  Baby Girl Blair (Chelsea) 0 days female MRN: [id number]    [facility] NURSERY Room / Bed: (N)/(N) Encounter: [id number]    Maternal Information     MOTHER:  Calli Blair  Maternal Age: 34 y.o.  OB History: # 1 - Date: 25, Sex: Female, Weight: 3720 g (8 lb 3.2 oz), GA: 39w0d, Type: , Low Transverse, Apgar1: 8, Apgar5: 9, Living: Living, Birth Comments: None   Previous breast reduction surgery? No    Lactation history:   Has patient previously breast fed: No   How long had patient previously breast fed:     Previous breast feeding complications:       Past Surgical History:   Procedure Laterality Date    NO PAST SURGERIES         Birth information:  YOB: 2025   Time of birth: 8:30 AM   Sex: female   Delivery type: , Low Transverse   Birth Weight: 3720 g (8 lb 3.2 oz)   Percent of Weight Change: 0%     Gestational Age: 39w0d   [unfilled]    Reason for Consult:    Reason for Consult: Follow-up assessment (ext) - 20 min, Latch Assess (ext) - 20 min, Latch Assess (routine) - 10 min, Breast/Nipple Pain - 5 min    Risk Factors:    Risk Factors: Maternal anatomy (small breasts; small, tan areolas, small nipples; slightly inverted)    Breast and nipple assessment:   Breasts/Nipples  Left Breast: Soft, Other (Comment) (small, tan, visible holden glands,)  Right Breast: Soft, Other (Comment)  Left Nipple: Everted, Other (Comment) (unsymmetrical nipples)  Right Nipple: Everted, Other (Comment) (small, unsymmetrical breasts)  Intervention: Hand expression  Breastfeeding Status: Yes  Breastfeeding Progress: Not yet established    OB Lactation Tools:         Breast Pump:    Breast Pump  Pump: Personal (s2)       Assessment:  spitty, sleepy, gaggy     Feeding assessment: no latch  LATCH:  Latch: Too sleepy or reluctant, no latch achieved   Audible Swallowing: None    Type of Nipple: Everted (After stimulation)   Comfort (Breast/Nipple): Soft/non-tender   Hold (Positioning): Partial assist, teach one side, mother does other, staff holds   LATCH Score: 5       Feeding recommendations:  breast feed on demand    Initial: Met with parents to discuss feeding plan. Mom wants to breastfeed. Mom anxious that baby has not latched since after delivery. Baby  due to breech. Baby is very spitty. Mom has baby S2S with her. Baby alseep. Demonstration with teach back of hand expression. Mom has everted nipples, but slightly inverted at 12 oclock. Transitioned mom to cross cradle hold on left breast. Mom moving a lot after . Demonstration with teach back of cross cradle hold. Baby latches on but does not suckle. Baby hanging on breast. Transitioned to left breast for football hold. Baby would not latch. Called in Christy Hume, IBCLC for assistance in getting baby to latch.     Education:   Provided demonstration, education and support of deep latch to breast by placing the nipple to the nose, dragging down to chin to achieve a wide latch. Bring baby to the breast, not breast to baby. Move your shoulders down and away from your ears. Look for ear, shoulder, hip alignment. Baby's upper and lower lip should be flanged on the breast.    Encouraged parents to call for assistance, questions, and concerns about breastfeeding.  Extension provided.  Ellyn Mauricio MA, IBCLC  2025 2:19 PM

## 2025-04-11 NOTE — LACTATION NOTE
This note was copied from a baby's chart.  CONSULT - LACTATION  Baby Girl Blair (Chelsea) 0 days female MRN: [id number]    [facility] AN NURSERY Room / Bed: (N)/(N) Encounter: [id number]    Maternal Information     MOTHER:  Calli Blair  Maternal Age: 34 y.o.  OB History: # 1 - Date: 25, Sex: Female, Weight: 3720 g (8 lb 3.2 oz), GA: 39w0d, Type: , Low Transverse, Apgar1: 8, Apgar5: 9, Living: Living, Birth Comments: None   Previous breast reduction surgery? No    Lactation history:   Has patient previously breast fed: No   How long had patient previously breast fed:     Previous breast feeding complications:       Past Surgical History:   Procedure Laterality Date    NO PAST SURGERIES         Birth information:  YOB: 2025   Time of birth: 8:30 AM   Sex: female   Delivery type: , Low Transverse   Birth Weight: 3720 g (8 lb 3.2 oz)   Percent of Weight Change: 0%     Gestational Age: 39w0d   [unfilled]    Reason for Consult:    Reason for Consult: Follow-up assessment (ext) - 20 min, Latch Assess (ext) - 20 min, Latch Assess (routine) - 10 min, Breast/Nipple Pain - 5 min    Risk Factors:    Risk Factors: Maternal anatomy (small breasts; small, tan areolas, small nipples; slightly inverted)    Breast and nipple assessment:   Breasts/Nipples  Left Breast: Soft, Other (Comment) (small, tan, visible holden glands,)  Right Breast: Soft, Other (Comment)  Left Nipple: Everted, Other (Comment) (unsymmetrical nipples)  Right Nipple: Everted, Other (Comment) (small, unsymmetrical breasts)  Intervention: Hand expression  Breastfeeding Status: Yes  Breastfeeding Progress: Not yet established    OB Lactation Tools:         Breast Pump:    Breast Pump  Pump: Personal (s2)       Assessment:  spitty after c/s birth; possible oral restriction;     General Appearance:  Alert, active, no distress  Head:  Normocephalic, AFOF                 "             Eyes:  Conjunctiva clear, +RR  Ears:  Normally placed, no anomalies  Nose: nares patent                           Mouth:  Palate intact; wider gape; palate is WNL; tongue elevates, visible muscle fatigue, Bilateral laterization is anterior only; extension is minimal, upon finger swipe under the tongue, tight oral frenulum; visible amniotic fluid; spitty after birth    Valorie had her first wet and mec. Diaper.    Feeding assessment: feeding well  LATCH:  Latch: Too sleepy or reluctant, no latch achieved   Audible Swallowing: None   Type of Nipple: Everted (After stimulation)   Comfort (Breast/Nipple): Soft/non-tender   Hold (Positioning): Partial assist, teach one side, mother does other, staff holds   LATCH Score: 5         Feeding recommendations:  breast feed on demand. Mom desires to breast feed. She took a prenatal breastfeeding class. Mom is highly anxious about latching and feeding as mom states she has \"odd-shaped nipples\"      Mom:  Breast: small, round breasts with small, dark areolas, visible holden glands. Mom states her breast grew and become darker during pregnancy. Upon palpation, glandular tissue is more present in upper, outer quadrant than in lower quadrants.   Nipple Assessment in General: everted, small, with asymmetrical height, some flat areas noted. L extends more than R  Mother's Awareness of Feeding Cues                 Recognizes: No, ed. provided                  Verbalizes: Yes, highly anxious, desires for baby to be satisfied.  Support System: FOB also anxious after birth  History of Breastfeeding: first child.     Latch After Lactation Education/Consult:  Efficiency: laid back on the R, attempted on the L but baby is sleepy              Lips Flanged: Yes              Depth of latch: deep with alignment and mom's arm supported against the breast              Audible Swallow: No              Visible Milk: Yes, with HE              Wide Open/ Asymmetrical: Yes, with " visible muscle fatigue              Suck Swallow Cycle: Breathing: yes, Coordinated: some  Nipple Assessment after latch: Normal: elongated/eraser, no discoloration and no damage noted.  Latch Problems: none with demonstration in laid back and hands on support    Position After Lactation Education/Consult:  Infant's Ergonomics/Body               Body Alignment: Yes               Head Supported: Yes, with pillows under moms arms               Close to Mom's body/ Lifted/ Supported: Yes, with hand on hand support               Mom's Ergonomics/Body: Yes                           Supported: Yes                           Sitting Back: Yes                           Brings Baby to her breast: Yes, with lactation guidance  Positioning Problems: none with demonstration. Enc. To offer both breasts; enc. S2s for feeds. Enc to watch output.    Demonstration how to use feeding log.     Mom has a pump at home from ins.     Feeding Plan:     Information on hand expression given. Discussed benefits of knowing how to manually express breast including stimulating milk supply, softening nipple for latch and evacuating breast in the event of engorgement.    Mom's nipple everts with stimulation. With nipple compression, short shank noted. Education on ways to elongate the nipple: Hand expression, Latch assist, breast shells, supple cups, manual and electric pump stimulation.     Education on positioning and alignment. Mom is encouraged to:     - Bring baby up to the breast (use of pillows to elevate so baby's torso is against mom's breasts)   - Skin to skin for feedings with top hand exposed to show signs of satiation   - Chin deep into breast tissue (make baby look up to the nipple)   - nose aligned to the nipple   -Wait for wide gape, place chin behind the areola on the breast so nipple is at the nose. Once baby opens their mouth wide, the nipple will be aimed at the upper, back palate  - Cheeks should be touching breast, and baby  "should have a long neck   - Ear, shoulder, hip will be in alignment   - Deep, snug hold of baby up to the breast   - Every baby knows how to breathe at the breast. The tip of the nose may appear to touch the breast. Babies breathe from the side of the nasal passages. If baby can not breathe due to improper alignment, baby will unlatch    Education on creating a snug hold of your infant to the breast by verifying the infant's cheek is touching the breast, your infant's chin is deep into the breast tissue, your infant's arms are \"hugging\" the breast, and your infant's lips are flanged on the areola. Bring infant to the breast, not your breast to the infant. Latch should feel like a tugging sensation on the nipple.    Mom is encouraged to place baby skin to skin for feedings. Skin to skin education provided for baby placement on mother's chest, baby only in diaper, blankets below shoulders on baby's back. Skin to skin is encouraged to continue at home for feedings and between feedings.    Mom is encouraged to meet early feeding cues, allow for non-nutritive suck, use breast compressions, and monitor baby's output. Mom wants to review feeding logs to verify if baby is meeting daily output of wet and soiled diapers.     Discussed with MOB how to utilize feeding log & monitor baby's output. Education on signs of satiation during a feeding, size of baby's belly, and offering both breasts at every feeding session provided.    Ed. On 3rd party distributors that offer different flanges on-line. ie) [employer]. Names of reputable companies like [employer] and [employer] offer flanges for every double electric pump. Lact[employer] will also provide a set of 12 mm to 19 mm flanges to fit most flanges.Enc. To try smaller flange and place a small amount of lanolin where the tunnel and funnel meet.     Ed. On various flange sizes. Ed. On various flange circumferences that may fit the mother's breast better. Ed. On possible use of Pumping " Pals or LacTeck flanges. Encouraged parents to call lactation once edema has dissipated to reassess the flange sizing.       (Scan QR code for [employer] - positions)   Review [employer] on youtube or scan QR code for [employer] video      [employer]        [employer] - positions      Christy Hume, MA 4/11/2025 1:15 PM

## 2025-04-11 NOTE — PLAN OF CARE
Problem: POSTPARTUM  Goal: Experiences normal postpartum course  Description: INTERVENTIONS:- Monitor maternal vital signs- Assess uterine involution and lochia  Outcome: Progressing  Goal: Appropriate maternal -  bonding  Description: INTERVENTIONS:- Identify family support- Assess for appropriate maternal/infant bonding -Encourage maternal/infant bonding opportunities- Referral to  or  as needed  Outcome: Progressing  Goal: Establishment of infant feeding pattern  Description: INTERVENTIONS:- Assess breast/bottle feeding- Refer to lactation as needed  Outcome: Progressing  Goal: Incision(s), wounds(s) or drain site(s) healing without S/S of infection  Description: INTERVENTIONS- Assess and document dressing, incision, wound bed, drain sites and surrounding tissue- Provide patient and family education  Outcome: Progressing

## 2025-04-11 NOTE — PROGRESS NOTES
"Post Op Check - OB/GYN  Calli Blair 34 y.o. female MRN: [id number]  Unit/Bed#:  322-01 Encounter: [id number]      Subjective:  Calli Blair is doing well post-operatively from a  delivery . Baby is in the room. She has tolerated PO without nausea or vomiting. Her pain is well controlled. She would like to move around more. Denies chest pain, SOB, or leg pain.     Vitals:   /62 (BP Location: Left arm)   Pulse 75   Temp 99 °F (37.2 °C) (Oral)   Resp 18   Ht 5' 4\" (1.626 m)   Wt 69.4 kg (153 lb)   LMP 2024 (Exact Date)   SpO2 96%   Breastfeeding Unknown   BMI 26.26 kg/m²     Physical Exam:   GEN: Calli Blair appears well, alert and oriented x 3, pleasant and cooperative   ABDOMEN: soft, no tenderness, no distention, fundus @ -1 below umbilicus, Incision C/D/I  EXTREMITIES: SCDs on        Janna Gutiérrez MD  2025  1:41 PM       "

## 2025-04-11 NOTE — ANESTHESIA POSTPROCEDURE EVALUATION
Post-Op Assessment Note    CV Status:  Stable    Pain management: adequate    Multimodal analgesia used between 6 hours prior to anesthesia start to PACU discharge    Mental Status:  Alert   PONV Controlled:  None   Airway Patency:  Patent     Post Op Vitals Reviewed: Yes    No anethesia notable event occurred.    Staff: Anesthesiologist           Last Filed PACU Vitals:  Vitals Value Taken Time   Temp 97.6 °F (36.4 °C) 04/11/25 0909   Pulse 66 04/11/25 0923   /56 04/11/25 0909   Resp 18 04/11/25 0909   SpO2 100 % 04/11/25 0923   Vitals shown include unfiled device data.    Modified Estrellita:     Vitals Value Taken Time   Activity 1 04/11/25 0909   Respiration 2 04/11/25 0909   Circulation 2 04/11/25 0909   Consciousness 2 04/11/25 0909   Oxygen Saturation 2 04/11/25 0909     Modified Estrellita Score: 9

## 2025-04-11 NOTE — PLAN OF CARE
Problem: BIRTH - VAGINAL/ SECTION  Goal: Fetal and maternal status remain reassuring during the birth process  Description: INTERVENTIONS:- Monitor vital signs- Monitor fetal heart rate- Monitor uterine activity- Monitor labor progression (vaginal delivery)- DVT prophylaxis- Antibiotic prophylaxis  Outcome: Progressing  Goal: Emotionally satisfying birthing experience for mother/fetus  Description: Interventions:- Assess, plan, implement and evaluate the nursing care given to the patient in labor- Advocate the philosophy that each childbirth experience is a unique experience and support the family's chosen level of involvement and control during the labor process - Actively participate in both the patient's and family's teaching of the birth process- Consider cultural, Protestant and age-specific factors and plan care for the patient in labor  Outcome: Progressing     Problem: PAIN - ADULT  Goal: Verbalizes/displays adequate comfort level or baseline comfort level  Description: Interventions:- Encourage patient to monitor pain and request assistance- Assess pain using appropriate pain scale- Administer analgesics based on type and severity of pain and evaluate response- Implement non-pharmacological measures as appropriate and evaluate response- Consider cultural and social influences on pain and pain management- Notify physician/advanced practitioner if interventions unsuccessful or patient reports new pain  Outcome: Progressing     Problem: INFECTION - ADULT  Goal: Absence or prevention of progression during hospitalization  Description: INTERVENTIONS:- Assess and monitor for signs and symptoms of infection- Monitor lab/diagnostic results- Monitor all insertion sites, i.e. indwelling lines, tubes, and drains- Monitor endotracheal if appropriate and nasal secretions for changes in amount and color- [place]ine appropriate cooling/warming therapies per order- Administer medications as ordered- Instruct and  encourage patient and family to use good hand hygiene technique- Identify and instruct in appropriate isolation precautions for identified infection/condition  Outcome: Progressing     Problem: SAFETY ADULT  Goal: Patient will remain free of falls  Description: INTERVENTIONS:- Educate patient/family on patient safety including physical limitations- Instruct patient to call for assistance with activity - Consult OT/PT to assist with strengthening/mobility - Keep Call bell within reach- Keep bed low and locked with side rails adjusted as appropriate- Keep care items and personal belongings within reach  Outcome: Progressing  Goal: Maintain or return to baseline ADL function  Description: INTERVENTIONS:-  Assess patient's ability to carry out ADLs; assess patient's baseline for ADL function and identify physical deficits which impact ability to perform ADLs (bathing, care of mouth/teeth, toileting, grooming, dressing, etc.)- Assess/evaluate cause of self-care deficits - Assess range of motion- Assess patient's mobility; develop plan if impaired- Assess patient's need for assistive devices and provide as appropriate- Encourage maximum independence but intervene and supervise when necessary- Involve family in performance of ADLs- Assess for home care needs following discharge - Consider OT consult to assist with ADL evaluation and planning for discharge- Provide patient education as appropriate  Outcome: Progressing  Goal: Maintains/Returns to pre admission functional level  Description: INTERVENTIONS:- Set and communicate daily mobility goal to care team and patient/family/caregiver. - Collaborate with rehabilitation services on mobility goals if consulted  Outcome: Progressing     Problem: Knowledge Deficit  Goal: Patient/family/caregiver demonstrates understanding of disease process, treatment plan, medications, and discharge instructions  Description: Complete learning assessment and assess knowledge  base.Interventions:- Provide teaching at level of understanding- Provide teaching via preferred learning methods  Outcome: Progressing     Problem: DISCHARGE PLANNING  Goal: Discharge to home or other facility with appropriate resources  Description: INTERVENTIONS:- Identify barriers to discharge w/patient and caregiver- Arrange for needed discharge resources and transportation as appropriate- Identify discharge learning needs (meds, wound care, etc.)- Arrange for interpretive services to assist at discharge as needed- Refer to Case Management Department for coordinating discharge planning if the patient needs post-hospital services based on physician/advanced practitioner order or complex needs related to functional status, cognitive ability, or social support system  Outcome: Progressing

## 2025-04-11 NOTE — ANESTHESIA PROCEDURE NOTES
Spinal Block    Patient location during procedure: OR  Start time: 4/11/2025 8:11 AM  Reason for block: procedure for pain and at surgeon's request  Staffing  Performed by: Shivam Amaya MD  Authorized by: Shivam Amaya MD    Preanesthetic Checklist  Completed: patient identified, IV checked, site marked, risks and benefits discussed, surgical consent, monitors and equipment checked, pre-op evaluation and timeout performed  Spinal Block  Patient position: sitting  Prep: ChloraPrep and site prepped and draped  Patient monitoring: frequent blood pressure checks, continuous pulse ox and heart rate  Approach: midline  Location: L3-4  Needle  Needle type: Pencan   Needle gauge: 24 G  Needle length: 4 in  Assessment  Sensory level: T4  Injection Assessment:  negative aspiration for heme, no paresthesia on injection and positive aspiration for clear CSF.  Post-procedure:  site cleaned

## 2025-04-11 NOTE — ANESTHESIA POSTPROCEDURE EVALUATION
Post-Op Assessment Note    CV Status:  Stable  Pain Score: 0    Pain management: adequate       Mental Status:  Alert and awake   Hydration Status:  Euvolemic   PONV Controlled:  Controlled   Airway Patency:  Patent     Post Op Vitals Reviewed: Yes    No anethesia notable event occurred.    Staff: CRNA           Last Filed PACU Vitals:  Vitals Value Taken Time   Temp     Pulse 81 04/11/25 0908   /56    Resp     SpO2 100 % 04/11/25 0908   Vitals shown include unfiled device data.

## 2025-04-11 NOTE — PLAN OF CARE
Problem: BIRTH - VAGINAL/ SECTION  Goal: Fetal and maternal status remain reassuring during the birth process  Description: INTERVENTIONS:- Monitor vital signs- Monitor fetal heart rate- Monitor uterine activity- Monitor labor progression (vaginal delivery)- DVT prophylaxis- Antibiotic prophylaxis  2025 by Ksenia Luna RN  Outcome: Completed  2025 by Ksenia Luna RN  Outcome: Progressing  Goal: Emotionally satisfying birthing experience for mother/fetus  Description: Interventions:- Assess, plan, implement and evaluate the nursing care given to the patient in labor- Advocate the philosophy that each childbirth experience is a unique experience and support the family's chosen level of involvement and control during the labor process - Actively participate in both the patient's and family's teaching of the birth process- Consider cultural, Church and age-specific factors and plan care for the patient in labor  2025 by Ksenia Luna RN  Outcome: Completed  2025 by Ksenia Luna RN  Outcome: Progressing

## 2025-04-11 NOTE — QUICK NOTE
"Obstetrics & Gynecology Post-Operative Check  Calli Blair 34 y.o. female MRN: [id number]  Unit/Bed#: -01 Encounter: [id number]      SUBJECTIVE:    Pain well controlled. She is tolerating PO intake. Voiding status: ashton in place. Flatus: yes. Bowel movement: no. Chest pain/shortness of breath: no. Calf pain: no.    OBJECTIVE:  Vitals:   /70 (BP Location: Left arm)   Pulse 76   Temp 98.3 °F (36.8 °C) (Oral)   Resp 18   Ht 5' 4\" (1.626 m)   Wt 69.4 kg (153 lb)   LMP 01/18/2024 (Exact Date)   SpO2 97%   Breastfeeding Unknown   BMI 26.26 kg/m²       Intake/Output Summary (Last 24 hours) at 4/11/2025 1938  Last data filed at 4/11/2025 1420  Gross per 24 hour   Intake 1750 ml   Output 1061 ml   Net 689 ml       Invasive Devices       Peripheral Intravenous Line  Duration             Peripheral IV 04/11/25 Left;Ventral (anterior) Forearm <1 day              Drain  Duration             Urethral Catheter Double-lumen;Non-latex 16 Fr. <1 day                    Physical Exam:   GEN: appears well, alert and oriented x 3, pleasant and cooperative   CARDIAC: regular rate  PULMONARY: nonlabored breathing  ABDOMEN: soft, no tenderness, no distention, Incision c/d/I, uterus felt 1 cm below the umbilicus   EXTREMITIES: SCDs on and pumping, nontender    ASSESSMENT/PLAN:  Postop Day #0 s/p , stable. Continue routine postoperative care.  -Out of bed/ ambulation as tolerated   -regular diet  -Pain control with hay tylenol/motrin  -FU am labs  -ashton in place      Anticipate discharge PPD 3 vs 4.    Sacha Srinivasan MD  Obstetrics & Gynecology PGY-1  4/11/2025  7:38 PM               "

## 2025-04-11 NOTE — OP NOTE
OPERATIVE REPORT  PATIENT NAME: Calli Blair    :  1990  MRN: [id number]  Pt Location: [facility] L&D OR ROOM 02    SURGERY DATE: 2025    Surgeons and Role:     * Sanjana Clancy DO - Primary     * Janna Gutiérrez MD - Assisting    Preop Diagnosis:  Breech presentation, single or unspecified fetus [O32.1XX0]    Post-Op Diagnosis Codes:     * Breech presentation, single or unspecified fetus [O32.1XX0]    Procedure(s) (LRB):   SECTION () (N/A)    Specimen(s):  ID Type Source Tests Collected by Time Destination   A :  Tissue (Placenta on Hold) OB Only Placenta PLACENTA IN STORAGE Sanjana Clancy,  2025 0835    B :  Cord Blood Cord BLOOD GAS, VENOUS, CORD, BLOOD GAS, ARTERIAL, CORD Sanjana Clancy DO 2025 0831        Surgical QBL:  Surgical QBL (mL): 686 mL      Drains:  Urethral Catheter Double-lumen;Non-latex 16 Fr. (Active)   Number of days: 0       Anesthesia Type:   Spinal    Operative Indications:  Breech presentation, single or unspecified fetus [O32.1XX0]       David Group Classification System:  No Multiple pregnancy, No Transverse or oblique lie, Breech lie, Nulliparous +  is DAVID GROUP 6    Operative Findings:  Gravid, normal appearing uterus  Viable female  delivered at 0830 with APGARS of 8 and 9 at 1 and 5 minutes respectively. Birth weight 8lbs 4oz  Intact placenta with eccentric insertion of 3 vessel umbilical cord  Normal appearing fallopian tubes and normal appearing right ovary. Left ovary with powder burn lesion suspicious for endometriosis  Umbilical Cord Arterial & Venous Blood Gases:   Umbilical Cord Venous Blood Gas:  Results from last 7 days   Lab Units 25  0831   PH COV  7.392   PCO2 COV mm HG 32.0   HCO3 COV mmol/L 19.0   BASE EXC COV mmol/L -4.8*   O2 CT CD VB mL/dL 12.0   O2 HGB, VENOUS CORD % 58.7     Umbilical Cord Arterial Blood Gas:  Results from last 7 days   Lab Units 25  0831   PH COA  7.288   PCO2  COA  46.5   PO2 COA mm HG 11.2   HCO3 COA mmol/L 21.8   BASE EXC COA mmol/L -4.9*   O2 CONTENT CORD ART ml/dl 2.8   O2 HGB, ARTERIAL CORD % 14.5       Complications:   None apparent    Procedure and Technique:  In the operating room the correct patient and procedures were identified. Spinal anesthesia was adequately established. Ancef IV was given for preoperative surgical prophylaxis. Patient was placed in the dorsal supine position with a left tilt of the hips. SCDs were applied to the lower extremities. Fetal heart tones were confirmed to be category I. Chlorhexidine was used to prep the vagina and perineum. A ashton catheter was placed without difficulty. Chloraprep was used to prep the abdomen. She was draped in the usual sterile fashion. Time out was performed with all in agreement.     A Pfannenstiel incision was made and carried down through the underlying subcutaneous tissue to the fascia using a scalpel. Rectus fascia was then incised. We then proceeded in Vini-Cruz fashion. All anatomic layers were well-demarcated. The rectus muscles were  and the peritoneum was identified, entered, and extended longitudinally with blunt dissection.      The bladder blade was inserted and the vesicouterine peritoneum was identified and a transverse incision was made in the lower uterine segment using a new surgical blade. Clear amniotic fluid was noted. The uterine incision was extended cephalad and caudal using blunt dissection.     The surgeon's hand was placed in the uterus, and fetal pelvis was palpated with the fetus in delroy breech position.  The fetal pelvis was elevated to the level of the hysterotomy, at which time the surgeon's hands were placed with thumbs on the fetal sacrum and fingers on the fetal anterior superior iliac spines.  The fetal pelvis was delivered, and the Pinard maneuver was performed to deliver the fetal legs.  the fetus was then delivered to the level of the scapulae.  The fetus was  rotated to have the left arm anterior, and the Loveset maneuver was performed to deliver the left fetal arm.  The fetus was rotated to have the right arm anterior, and the right fetal arm was delivered in the same fashion.  The Mauriceau Smellie Veit maneuver was performed to flex the fetal head, after which the fetal head spontaneously delivered.    Following delayed cord clamping, the umbilical cord was doubly clamped and cut. The infant was then passed off the table to the awaiting  staff. The  was noted to cry spontaneously and moved all extremities. Blood gas, cord blood, and portion of cord were obtained for analysis and routine blood testing.  The placenta delivered with uterine massage and was noted to be intact with and had eccentric insertion of a three-vessel cord. The placenta was sent to storage. Oxytocin was administered by IV infusion to enhance uterine contraction. The uterus was exteriorized and cleared of all clots and remaining products of conception.      The hysterotomy was reapproximated using 0-Monocryl on a CTX in a running locked fashion.  A second imbricating stitch with 0-Monocryl on a CTX was applied. Hemostasis was achieved. The posterior cul-de-sac was cleared of all clots and products of conception. The uterus was replaced into the abdomen and the pericolic gutters were cleared of all clots. Hemostasis was once again confirmed at the hysterotomy. The peritoneum was re-approximated with 3-0 Plain gut on CT-1 in a purse-string fashion. The fascia was reapproximated using 0-Vicryl in a running nonlocked fashion. The subcutaneous tissue was cleared of all clots and debris. Good hemostasis was noted with Bovie electrocautery. The skin incision was closed in a running subcuticular fashion with Stratafix.  Good hemostasis was noted. Exofin was applied. The uterus was expressed of clots . Patient tolerated the procedure well.  All needle, sponge, and instrument counts were noted  to be correct x 2 at the end of the procedure.  Patient was transferred to the recovery room in stable condition.      Dr. Clancy was present for the procedure.    Patient Disposition:  PACU         SIGNATURE: Janna Gutiérrez MD  DATE: April 11, 2025  TIME: 9:10 AM

## 2025-04-11 NOTE — ANESTHESIA PREPROCEDURE EVALUATION
Procedure:   SECTION () (Uterus)    Relevant Problems   ENDO   (+) Subclinical hypothyroidism      GYN   (+) 39 weeks gestation of pregnancy      NEURO/PSYCH   (+) Depression affecting pregnancy in first trimester, antepartum        Physical Exam    Airway    Mallampati score: I  TM Distance: >3 FB  Neck ROM: full     Dental       Cardiovascular  Cardiovascular exam normal    Pulmonary  Pulmonary exam normal     Other Findings  post-pubertal.      Anesthesia Plan  ASA Score- 2     Anesthesia Type- spinal with ASA Monitors.         Additional Monitors:     Airway Plan:            Plan Factors-Exercise tolerance (METS): >4 METS.    Chart reviewed. EKG reviewed. Imaging results reviewed. Existing labs reviewed. Patient summary reviewed.    Patient is not a current smoker.  Patient instructed to abstain from smoking on day of procedure. Patient did not smoke on day of surgery.    Obstructive sleep apnea risk education given perioperatively.        Induction-     Postoperative Plan- Plan for postoperative opioid use.     Perioperative Resuscitation Plan - Level 1 - Full Code.       Informed Consent- Anesthetic plan and risks discussed with patient.  I personally reviewed this patient with the CRNA. Discussed and agreed on the Anesthesia Plan with the CRNA..      NPO Status:  Vitals Value Taken Time   Date of last liquid 04/10/25 04/11/25 0639   Time of last liquid 25 0639   Date of last solid 04/10/25 04/11/25 0639   Time of last solid 25 0639

## 2025-04-12 LAB
ERYTHROCYTE [DISTWIDTH] IN BLOOD BY AUTOMATED COUNT: 15.3 % (ref 11.6–15.1)
HCT VFR BLD AUTO: 28.6 % (ref 34.8–46.1)
HGB BLD-MCNC: 9.1 G/DL (ref 11.5–15.4)
MCH RBC QN AUTO: 27.2 PG (ref 26.8–34.3)
MCHC RBC AUTO-ENTMCNC: 31.8 G/DL (ref 31.4–37.4)
MCV RBC AUTO: 85 FL (ref 82–98)
PLATELET # BLD AUTO: 193 THOUSANDS/UL (ref 149–390)
PMV BLD AUTO: 11 FL (ref 8.9–12.7)
RBC # BLD AUTO: 3.35 MILLION/UL (ref 3.81–5.12)
WBC # BLD AUTO: 11.08 THOUSAND/UL (ref 4.31–10.16)

## 2025-04-12 PROCEDURE — 99024 POSTOP FOLLOW-UP VISIT: CPT | Performed by: OBSTETRICS & GYNECOLOGY

## 2025-04-12 RX ORDER — OXYCODONE HYDROCHLORIDE 10 MG/1
10 TABLET ORAL EVERY 4 HOURS PRN
Status: DISCONTINUED | OUTPATIENT
Start: 2025-04-12 | End: 2025-04-14 | Stop reason: HOSPADM

## 2025-04-12 RX ORDER — OXYCODONE HYDROCHLORIDE 5 MG/1
5 TABLET ORAL EVERY 4 HOURS PRN
Status: DISCONTINUED | OUTPATIENT
Start: 2025-04-12 | End: 2025-04-14 | Stop reason: HOSPADM

## 2025-04-12 RX ADMIN — DOCUSATE SODIUM 100 MG: 100 CAPSULE, LIQUID FILLED ORAL at 10:26

## 2025-04-12 RX ADMIN — ACETAMINOPHEN 650 MG: 325 TABLET, FILM COATED ORAL at 12:59

## 2025-04-12 RX ADMIN — DOCUSATE SODIUM 100 MG: 100 CAPSULE, LIQUID FILLED ORAL at 17:22

## 2025-04-12 RX ADMIN — ACETAMINOPHEN 650 MG: 325 TABLET, FILM COATED ORAL at 17:22

## 2025-04-12 RX ADMIN — KETOROLAC TROMETHAMINE 30 MG: 30 INJECTION, SOLUTION INTRAMUSCULAR; INTRAVENOUS at 17:19

## 2025-04-12 RX ADMIN — KETOROLAC TROMETHAMINE 30 MG: 30 INJECTION, SOLUTION INTRAMUSCULAR; INTRAVENOUS at 23:15

## 2025-04-12 RX ADMIN — SERTRALINE HYDROCHLORIDE 50 MG: 50 TABLET ORAL at 21:40

## 2025-04-12 RX ADMIN — ACETAMINOPHEN 650 MG: 325 TABLET, FILM COATED ORAL at 06:07

## 2025-04-12 RX ADMIN — KETOROLAC TROMETHAMINE 30 MG: 30 INJECTION, SOLUTION INTRAMUSCULAR; INTRAVENOUS at 04:19

## 2025-04-12 RX ADMIN — ACETAMINOPHEN 650 MG: 325 TABLET, FILM COATED ORAL at 23:15

## 2025-04-12 RX ADMIN — KETOROLAC TROMETHAMINE 30 MG: 30 INJECTION, SOLUTION INTRAMUSCULAR; INTRAVENOUS at 10:26

## 2025-04-12 NOTE — PLAN OF CARE
Problem: POSTPARTUM  Goal: Experiences normal postpartum course  Description: INTERVENTIONS:- Monitor maternal vital signs- Assess uterine involution and lochia  Outcome: Progressing  Goal: Appropriate maternal -  bonding  Description: INTERVENTIONS:- Identify family support- Assess for appropriate maternal/infant bonding -Encourage maternal/infant bonding opportunities- Referral to  or  as needed  Outcome: Progressing  Goal: Establishment of infant feeding pattern  Description: INTERVENTIONS:- Assess breast/bottle feeding- Refer to lactation as needed  Outcome: Progressing  Goal: Incision(s), wounds(s) or drain site(s) healing without S/S of infection  Description: INTERVENTIONS- Assess and document dressing, incision, wound bed, drain sites and surrounding tissue- Provide patient and family education  Outcome: Progressing     Problem: PAIN - ADULT  Goal: Verbalizes/displays adequate comfort level or baseline comfort level  Description: Interventions:- Encourage patient to monitor pain and request assistance- Assess pain using appropriate pain scale- Administer analgesics based on type and severity of pain and evaluate response- Implement non-pharmacological measures as appropriate and evaluate response- Consider cultural and social influences on pain and pain management- Notify physician/advanced practitioner if interventions unsuccessful or patient reports new pain  Outcome: Progressing     Problem: INFECTION - ADULT  Goal: Absence or prevention of progression during hospitalization  Description: INTERVENTIONS:- Assess and monitor for signs and symptoms of infection- Monitor lab/diagnostic results- Monitor all insertion sites, i.e. indwelling lines, tubes, and drains- Monitor endotracheal if appropriate and nasal secretions for changes in amount and color- [place] appropriate cooling/warming therapies per order- Administer medications as ordered- Instruct and encourage patient  and family to use good hand hygiene technique- Identify and instruct in appropriate isolation precautions for identified infection/condition  Outcome: Progressing     Problem: SAFETY ADULT  Goal: Patient will remain free of falls  Description: INTERVENTIONS:- Educate patient/family on patient safety including physical limitations- Instruct patient to call for assistance with activity - Consult OT/PT to assist with strengthening/mobility - Keep Call bell within reach- Keep bed low and locked with side rails adjusted as appropriate- Keep care items and personal belongings within reach  Outcome: Progressing  Goal: Maintain or return to baseline ADL function  Description: INTERVENTIONS:-  Assess patient's ability to carry out ADLs; assess patient's baseline for ADL function and identify physical deficits which impact ability to perform ADLs (bathing, care of mouth/teeth, toileting, grooming, dressing, etc.)- Assess/evaluate cause of self-care deficits - Assess range of motion- Assess patient's mobility; develop plan if impaired- Assess patient's need for assistive devices and provide as appropriate- Encourage maximum independence but intervene and supervise when necessary- Involve family in performance of ADLs- Assess for home care needs following discharge - Consider OT consult to assist with ADL evaluation and planning for discharge- Provide patient education as appropriate  Outcome: Progressing  Goal: Maintains/Returns to pre admission functional level  Description: INTERVENTIONS:- Set and communicate daily mobility goal to care team and patient/family/caregiver. - Collaborate with rehabilitation services on mobility goals if consulted  Outcome: Progressing     Problem: Knowledge Deficit  Goal: Patient/family/caregiver demonstrates understanding of disease process, treatment plan, medications, and discharge instructions  Description: Complete learning assessment and assess knowledge base.Interventions:- Provide  teaching at level of understanding- Provide teaching via preferred learning methods  Outcome: Progressing     Problem: DISCHARGE PLANNING  Goal: Discharge to home or other facility with appropriate resources  Description: INTERVENTIONS:- Identify barriers to discharge w/patient and caregiver- Arrange for needed discharge resources and transportation as appropriate- Identify discharge learning needs (meds, wound care, etc.)- Arrange for interpretive services to assist at discharge as needed- Refer to Case Management Department for coordinating discharge planning if the patient needs post-hospital services based on physician/advanced practitioner order or complex needs related to functional status, cognitive ability, or social support system  Outcome: Progressing

## 2025-04-12 NOTE — ASSESSMENT & PLAN NOTE
Continue routine post partum care       [id number], HgB 10.9 -> 9.1, venofer ordered  Pain well controlled: tylenol/motrin scheduled, la prn  Lochia within normal limits: continue to monitor   OOB: as able, encourage ambulation  Passing flatus  Voiding spontaneously  DVT ppx: SCDs

## 2025-04-12 NOTE — PLAN OF CARE
Problem: POSTPARTUM  Goal: Experiences normal postpartum course  Description: INTERVENTIONS:- Monitor maternal vital signs- Assess uterine involution and lochia  Outcome: Progressing  Goal: Appropriate maternal -  bonding  Description: INTERVENTIONS:- Identify family support- Assess for appropriate maternal/infant bonding -Encourage maternal/infant bonding opportunities- Referral to  or  as needed  Outcome: Progressing  Goal: Establishment of infant feeding pattern  Description: INTERVENTIONS:- Assess breast/bottle feeding- Refer to lactation as needed  Outcome: Progressing  Goal: Incision(s), wounds(s) or drain site(s) healing without S/S of infection  Description: INTERVENTIONS- Assess and document dressing, incision, wound bed, drain sites and surrounding tissue- Provide patient and family education  Outcome: Progressing     Problem: PAIN - ADULT  Goal: Verbalizes/displays adequate comfort level or baseline comfort level  Description: Interventions:- Encourage patient to monitor pain and request assistance- Assess pain using appropriate pain scale- Administer analgesics based on type and severity of pain and evaluate response- Implement non-pharmacological measures as appropriate and evaluate response- Consider cultural and social influences on pain and pain management- Notify physician/advanced practitioner if interventions unsuccessful or patient reports new pain  Outcome: Progressing     Problem: INFECTION - ADULT  Goal: Absence or prevention of progression during hospitalization  Description: INTERVENTIONS:- Assess and monitor for signs and symptoms of infection- Monitor lab/diagnostic results- Monitor all insertion sites, i.e. indwelling lines, tubes, and drains- Monitor endotracheal if appropriate and nasal secretions for changes in amount and color- [place] appropriate cooling/warming therapies per order- Administer medications as ordered- Instruct and encourage patient  and family to use good hand hygiene technique- Identify and instruct in appropriate isolation precautions for identified infection/condition  Outcome: Progressing     Problem: SAFETY ADULT  Goal: Patient will remain free of falls  Description: INTERVENTIONS:- Educate patient/family on patient safety including physical limitations- Instruct patient to call for assistance with activity - Consult OT/PT to assist with strengthening/mobility - Keep Call bell within reach- Keep bed low and locked with side rails adjusted as appropriate- Keep care items and personal belongings within reach  Outcome: Progressing  Goal: Maintain or return to baseline ADL function  Description: INTERVENTIONS:-  Assess patient's ability to carry out ADLs; assess patient's baseline for ADL function and identify physical deficits which impact ability to perform ADLs (bathing, care of mouth/teeth, toileting, grooming, dressing, etc.)- Assess/evaluate cause of self-care deficits - Assess range of motion- Assess patient's mobility; develop plan if impaired- Assess patient's need for assistive devices and provide as appropriate- Encourage maximum independence but intervene and supervise when necessary- Involve family in performance of ADLs- Assess for home care needs following discharge - Consider OT consult to assist with ADL evaluation and planning for discharge- Provide patient education as appropriate  Outcome: Progressing  Goal: Maintains/Returns to pre admission functional level  Description: INTERVENTIONS:- Set and communicate daily mobility goal to care team and patient/family/caregiver. - Collaborate with rehabilitation services on mobility goals if consulted  Outcome: Progressing     Problem: Knowledge Deficit  Goal: Patient/family/caregiver demonstrates understanding of disease process, treatment plan, medications, and discharge instructions  Description: Complete learning assessment and assess knowledge base.Interventions:- Provide  teaching at level of understanding- Provide teaching via preferred learning methods  Outcome: Progressing     Problem: DISCHARGE PLANNING  Goal: Discharge to home or other facility with appropriate resources  Description: INTERVENTIONS:- Identify barriers to discharge w/patient and caregiver- Arrange for needed discharge resources and transportation as appropriate- Identify discharge learning needs (meds, wound care, etc.)- Arrange for interpretive services to assist at discharge as needed- Refer to Case Management Department for coordinating discharge planning if the patient needs post-hospital services based on physician/advanced practitioner order or complex needs related to functional status, cognitive ability, or social support system  Outcome: Progressing

## 2025-04-12 NOTE — PROGRESS NOTES
Progress Note - OB/GYN   Name: Calli Blair 34 y.o. female I MRN: [id number]  Unit/Bed#: -01 I Date of Admission: 2025   Date of Service: 2025 I Hospital Day: 1     Assessment & Plan  S/P primary low transverse        Continue routine post partum care       [id number], HgB 10.9 -> 9.1, venofer ordered  Pain well controlled: tylenol/motrin scheduled, la prn  Lochia within normal limits: continue to monitor   OOB: as able, encourage ambulation  Passing flatus  Voiding spontaneously  DVT ppx: SCDs  Depression affecting pregnancy in first trimester, antepartum  Takes Zoloft 50mg qd    OB Post-Partum Progress Note  Subjective   Post delivery. Patient is doing well. Lochia WNL. Pain well controlled.     Pain: yes, cramping, improved with meds  Tolerating PO: yes  Voiding: yes  Flatus: yes  BM: no  Ambulating: yes  Breastfeeding:  yes  Chest pain: no  Shortness of breath: no  Leg pain: no  Lochia: WNL    Objective :  Temp:  [97.6 °F (36.4 °C)-99 °F (37.2 °C)] 98.1 °F (36.7 °C)  HR:  [63-87] 73  BP: ()/(53-85) 102/63  Resp:  [17-18] 18  SpO2:  [96 %-100 %] 97 %  O2 Device: None (Room air)    Physical Exam  Vitals and nursing note reviewed.   Constitutional:       General: She is not in acute distress.     Appearance: She is well-developed.   HENT:      Head: Normocephalic and atraumatic.   Eyes:      Conjunctiva/sclera: Conjunctivae normal.   Cardiovascular:      Rate and Rhythm: Normal rate and regular rhythm.      Heart sounds: No murmur heard.  Pulmonary:      Effort: Pulmonary effort is normal. No respiratory distress.      Breath sounds: Normal breath sounds.   Abdominal:      Palpations: Abdomen is soft.      Tenderness: There is no abdominal tenderness.   Musculoskeletal:         General: No swelling.      Cervical back: Neck supple.   Skin:     General: Skin is warm and dry.      Capillary Refill: Capillary refill takes less than 2 seconds.   Neurological:      Mental Status: She is  alert.   Psychiatric:         Mood and Affect: Mood normal.         Lab Results: I have reviewed the following results:  Lab Results   Component Value Date    WBC 11.08 (H) 04/11/2025    HGB 9.1 (L) 04/11/2025    HCT 28.6 (L) 04/11/2025    MCV 85 04/11/2025     04/11/2025     Varsha Vela  PGY-1 OB/GYN  04/12/25  6:53 AM

## 2025-04-13 PROCEDURE — 99024 POSTOP FOLLOW-UP VISIT: CPT | Performed by: OBSTETRICS & GYNECOLOGY

## 2025-04-13 RX ADMIN — IBUPROFEN 600 MG: 600 TABLET, FILM COATED ORAL at 06:24

## 2025-04-13 RX ADMIN — ACETAMINOPHEN 650 MG: 325 TABLET, FILM COATED ORAL at 21:34

## 2025-04-13 RX ADMIN — IBUPROFEN 600 MG: 600 TABLET, FILM COATED ORAL at 18:28

## 2025-04-13 RX ADMIN — ACETAMINOPHEN 650 MG: 325 TABLET, FILM COATED ORAL at 15:17

## 2025-04-13 RX ADMIN — SERTRALINE HYDROCHLORIDE 50 MG: 50 TABLET ORAL at 21:34

## 2025-04-13 RX ADMIN — DOCUSATE SODIUM 100 MG: 100 CAPSULE, LIQUID FILLED ORAL at 18:28

## 2025-04-13 RX ADMIN — DOCUSATE SODIUM 100 MG: 100 CAPSULE, LIQUID FILLED ORAL at 09:14

## 2025-04-13 RX ADMIN — ACETAMINOPHEN 650 MG: 325 TABLET, FILM COATED ORAL at 06:24

## 2025-04-13 RX ADMIN — IBUPROFEN 600 MG: 600 TABLET, FILM COATED ORAL at 11:31

## 2025-04-13 NOTE — ASSESSMENT & PLAN NOTE
Continue routine post partum care       [id number], HgB 10.9 -> 9.1, venofer ordered  Pain well controlled: tylenol/motrin scheduled, la prn  Lochia within normal limits: continue to monitor   OOB: as able, encourage ambulation  Passing flatus  Voiding spontaneously  DVT ppx: SCDs

## 2025-04-13 NOTE — PROGRESS NOTES
Progress Note - OB/GYN   Name: Calli Blair 34 y.o. female I MRN: [id number]  Unit/Bed#: -01 I Date of Admission: 2025   Date of Service: 2025 I Hospital Day: 2     Assessment & Plan  S/P primary low transverse        Continue routine post partum care       [id number], HgB 10.9 -> 9.1, venofer ordered  Pain well controlled: tylenol/motrin scheduled, la prn  Lochia within normal limits: continue to monitor   OOB: as able, encourage ambulation  Passing flatus  Voiding spontaneously  DVT ppx: SCDs  Depression affecting pregnancy in first trimester, antepartum  Takes Zoloft 50mg qd    OB Post-Partum Progress Note  Subjective   Post delivery. Patient is doing well. Lochia WNL. Pain well controlled.     Pain: yes, cramping, improved with meds  Tolerating PO: yes  Voiding: yes  Flatus: yes  BM: no  Ambulating: yes  Breastfeeding:  yes  Chest pain: no  Shortness of breath: no  Leg pain: no  Lochia: WNL    Objective :  Temp:  [97.9 °F (36.6 °C)-98.8 °F (37.1 °C)] 97.9 °F (36.6 °C)  HR:  [69-88] 69  BP: (101-119)/(51-68) 116/68  Resp:  [18-20] 18  SpO2:  [97 %-98 %] 97 %  O2 Device: None (Room air)    Physical Exam  Vitals and nursing note reviewed.   Constitutional:       General: She is not in acute distress.     Appearance: She is well-developed.   HENT:      Head: Normocephalic and atraumatic.   Eyes:      Conjunctiva/sclera: Conjunctivae normal.   Cardiovascular:      Rate and Rhythm: Normal rate and regular rhythm.      Heart sounds: No murmur heard.  Pulmonary:      Effort: Pulmonary effort is normal. No respiratory distress.      Breath sounds: Normal breath sounds.   Abdominal:      Palpations: Abdomen is soft.      Tenderness: There is no abdominal tenderness.   Musculoskeletal:         General: No swelling.      Cervical back: Neck supple.   Skin:     General: Skin is warm and dry.      Capillary Refill: Capillary refill takes less than 2 seconds.   Neurological:      Mental Status: She is  alert.   Psychiatric:         Mood and Affect: Mood normal.         Lab Results: I have reviewed the following results:  Lab Results   Component Value Date    WBC 11.08 (H) 04/11/2025    HGB 9.1 (L) 04/11/2025    HCT 28.6 (L) 04/11/2025    MCV 85 04/11/2025     04/11/2025     Varsha Vela  PGY-1 OB/GYN  04/13/25  5:05 AM

## 2025-04-13 NOTE — LACTATION NOTE
CONSULT - LACTATION  Calli Blair 34 y.o. female MRN: [id number]    [facility] AN L&D Room / Bed:  322/ 322-01 Encounter: [id number]    Maternal Information     MOTHER:  N/A  Maternal Age: This patient's mother is not on file.  OB History: This patient's mother is not on file.  Previous breast reduction surgery? No    Lactation history:   Has patient previously breast fed: No   How long had patient previously breast fed:     Previous breast feeding complications:         Reason for Consult:    Reason for Consult: Initial assessment (routine) - 10 min, Latch Assess (routine) - 10 min    Risk Factors:    Risk Factors: Maternal anatomy    Breast and nipple assessment:   Breasts/Nipples  Left Breast: Soft  Right Breast: Soft  Left Nipple: Everted, Cracked  Right Nipple: Everted, Cracked  Intervention: Lanolin  Breastfeeding Status: Yes  Breastfeeding Progress: Breastfeeding well, Not yet established    OB Lactation Tools:         Breast Pump:            Assessment:  not assessed; at breast    Feeding assessment: feeding well  LATCH:  Latch: Grasps breast, tongue down, lips flanged, rhythmic sucking   Audible Swallowing: Spontaneous and intermittent (24 hours old)   Type of Nipple: Everted (After stimulation)   Comfort (Breast/Nipple): Filling, red/small blisters/bruises, mild/moderate discomfort   Hold (Positioning): No assist from staff, mother able to position/hold infant   LATCH Score: 9           Feeding recommendations:  breast feed on demand    Met with Calli who is breastfeeding her baby girl. Calli reports that baby has been cluster feeding and has appropriate diaper output. Her nipples are cracked and scabbed in compression stripes down the faces. With education and teachvback of an asymmetrical latch, Calli was able to independently latch baby to the left breast in cross cradle hold with no hands on support. She had minimal initial latch on pain. Discussed initial  latch on pain versus shallow latch pain. Encouraged using nipple butter after feeds. Discussed delaying pacifiers for the first month and allowing baby to meet all sucking needs at the breast. Discussed delaying introducing a bottle until one month to establish a strong milk supple and comfortable latch; discussed reasons bottles and pumping would need to be introduced sooner. Encouraged continuing prenatal vitamin, taking vitamin d, and eating/drinking to hunger/thirst. Encouraged to call for lactation support as needed.     Leon Mena MA 4/13/2025 4:55 PM

## 2025-04-13 NOTE — PLAN OF CARE
Problem: POSTPARTUM  Goal: Experiences normal postpartum course  Description: INTERVENTIONS:- Monitor maternal vital signs- Assess uterine involution and lochia  Outcome: Progressing  Goal: Appropriate maternal -  bonding  Description: INTERVENTIONS:- Identify family support- Assess for appropriate maternal/infant bonding -Encourage maternal/infant bonding opportunities- Referral to  or  as needed  Outcome: Progressing  Goal: Establishment of infant feeding pattern  Description: INTERVENTIONS:- Assess breast/bottle feeding- Refer to lactation as needed  Outcome: Progressing  Goal: Incision(s), wounds(s) or drain site(s) healing without S/S of infection  Description: INTERVENTIONS- Assess and document dressing, incision, wound bed, drain sites and surrounding tissue- Provide patient and family education  Outcome: Progressing     Problem: PAIN - ADULT  Goal: Verbalizes/displays adequate comfort level or baseline comfort level  Description: Interventions:- Encourage patient to monitor pain and request assistance- Assess pain using appropriate pain scale- Administer analgesics based on type and severity of pain and evaluate response- Implement non-pharmacological measures as appropriate and evaluate response- Consider cultural and social influences on pain and pain management- Notify physician/advanced practitioner if interventions unsuccessful or patient reports new pain  Outcome: Progressing     Problem: INFECTION - ADULT  Goal: Absence or prevention of progression during hospitalization  Description: INTERVENTIONS:- Assess and monitor for signs and symptoms of infection- Monitor lab/diagnostic results- Monitor all insertion sites, i.e. indwelling lines, tubes, and drains- Monitor endotracheal if appropriate and nasal secretions for changes in amount and color- [place] appropriate cooling/warming therapies per order- Administer medications as ordered- Instruct and encourage patient  and family to use good hand hygiene technique- Identify and instruct in appropriate isolation precautions for identified infection/condition  Outcome: Progressing     Problem: SAFETY ADULT  Goal: Patient will remain free of falls  Description: INTERVENTIONS:- Educate patient/family on patient safety including physical limitations- Instruct patient to call for assistance with activity - Consult OT/PT to assist with strengthening/mobility - Keep Call bell within reach- Keep bed low and locked with side rails adjusted as appropriate- Keep care items and personal belongings within reach  Outcome: Progressing  Goal: Maintain or return to baseline ADL function  Description: INTERVENTIONS:-  Assess patient's ability to carry out ADLs; assess patient's baseline for ADL function and identify physical deficits which impact ability to perform ADLs (bathing, care of mouth/teeth, toileting, grooming, dressing, etc.)- Assess/evaluate cause of self-care deficits - Assess range of motion- Assess patient's mobility; develop plan if impaired- Assess patient's need for assistive devices and provide as appropriate- Encourage maximum independence but intervene and supervise when necessary- Involve family in performance of ADLs- Assess for home care needs following discharge - Consider OT consult to assist with ADL evaluation and planning for discharge- Provide patient education as appropriate  Outcome: Progressing  Goal: Maintains/Returns to pre admission functional level  Description: INTERVENTIONS:- Set and communicate daily mobility goal to care team and patient/family/caregiver. - Collaborate with rehabilitation services on mobility goals if consulted  Outcome: Progressing     Problem: Knowledge Deficit  Goal: Patient/family/caregiver demonstrates understanding of disease process, treatment plan, medications, and discharge instructions  Description: Complete learning assessment and assess knowledge base.Interventions:- Provide  teaching at level of understanding- Provide teaching via preferred learning methods  Outcome: Progressing     Problem: DISCHARGE PLANNING  Goal: Discharge to home or other facility with appropriate resources  Description: INTERVENTIONS:- Identify barriers to discharge w/patient and caregiver- Arrange for needed discharge resources and transportation as appropriate- Identify discharge learning needs (meds, wound care, etc.)- Arrange for interpretive services to assist at discharge as needed- Refer to Case Management Department for coordinating discharge planning if the patient needs post-hospital services based on physician/advanced practitioner order or complex needs related to functional status, cognitive ability, or social support system  Outcome: Progressing

## 2025-04-14 ENCOUNTER — TELEPHONE (OUTPATIENT)
Age: 35
End: 2025-04-14

## 2025-04-14 VITALS
HEIGHT: 64 IN | BODY MASS INDEX: 26.12 KG/M2 | RESPIRATION RATE: 20 BRPM | TEMPERATURE: 98.5 F | DIASTOLIC BLOOD PRESSURE: 68 MMHG | WEIGHT: 153 LBS | SYSTOLIC BLOOD PRESSURE: 117 MMHG | OXYGEN SATURATION: 99 % | HEART RATE: 71 BPM

## 2025-04-14 PROCEDURE — 99254 IP/OBS CNSLTJ NEW/EST MOD 60: CPT | Performed by: PHYSICIAN ASSISTANT

## 2025-04-14 PROCEDURE — 99024 POSTOP FOLLOW-UP VISIT: CPT | Performed by: OBSTETRICS & GYNECOLOGY

## 2025-04-14 PROCEDURE — NC001 PR NO CHARGE: Performed by: OBSTETRICS & GYNECOLOGY

## 2025-04-14 RX ORDER — OXYCODONE HYDROCHLORIDE 5 MG/1
5 TABLET ORAL EVERY 4 HOURS PRN
Qty: 6 TABLET | Refills: 0 | Status: SHIPPED | OUTPATIENT
Start: 2025-04-14 | End: 2025-04-23

## 2025-04-14 RX ORDER — ACETAMINOPHEN 325 MG/1
650 TABLET ORAL EVERY 6 HOURS SCHEDULED
Start: 2025-04-14 | End: 2025-04-15

## 2025-04-14 RX ORDER — IBUPROFEN 600 MG/1
600 TABLET, FILM COATED ORAL EVERY 6 HOURS
Qty: 30 TABLET | Refills: 0 | Status: SHIPPED | OUTPATIENT
Start: 2025-04-14 | End: 2025-04-23

## 2025-04-14 RX ADMIN — IBUPROFEN 600 MG: 600 TABLET, FILM COATED ORAL at 08:11

## 2025-04-14 RX ADMIN — DOCUSATE SODIUM 100 MG: 100 CAPSULE, LIQUID FILLED ORAL at 08:10

## 2025-04-14 RX ADMIN — IBUPROFEN 600 MG: 600 TABLET, FILM COATED ORAL at 01:51

## 2025-04-14 RX ADMIN — ACETAMINOPHEN 650 MG: 325 TABLET, FILM COATED ORAL at 10:37

## 2025-04-14 RX ADMIN — ACETAMINOPHEN 650 MG: 325 TABLET, FILM COATED ORAL at 04:31

## 2025-04-14 NOTE — ASSESSMENT & PLAN NOTE
Ibuprofen 600 mg p.o. every 6 hours scheduled.  Oxycodone 5 mg p.o. every 4 hours as needed moderate pain or 10 mg p.o. every 4 hours as needed severe pain.  Hydromorphone 0.5 mg IV every 2 hours as needed breakthrough pain.  Would discontinue this if used twice or less in 24 hours.  As needed Narcan in the event that opioid reversal becomes necessary.  Bowel regimen to avoid opioid-induced constipation while on opioid pain medication.  Neuraxial Duramorph:     Methadone and Duramorph Intra-procedure Administrations (last 48 hours)  Morphine (Duramorph) may show additional administrations that are not neuraxial. Review route and comments.      None           Monitoring to continue for 24 hours post Duramorph administration:  Monitor pain, HR, BP, respirations, level of sedation and oxygen levels as ordered  Continuous pulse oximetry (or capnography) for 24 hours.  Notify Anesthesia/Acute Pain Services for the following:  Persistent pruritis, persistent nausea, or if respiratory rate is less than or equal to 10 breaths per minute or if pain is unrelieved or if patient is excessively sedated or O2 Sat less than 90% or EtCO2 outside the parameters of Respiratory Therapy Capnography/EtCO2 policy  No narcotics / sedatives / sleeping agents not ordered by Anesthesia/Acute Pain Service for 24 hours post duramorph     no

## 2025-04-14 NOTE — PROGRESS NOTES
Obstetrics Progress Note  Calli Blair 34 y.o. female MRN: [id number]  Unit/Bed#: -01 Encounter: [id number]    Assessment/Plan:  Postoperative day #3 s/p primary low transverse  delivery. Stable.  Baby in room. By issue:  Assessment & Plan  S/P primary low transverse   Continue routine post partum care  , HgB 10.9 -> 9.1, venofer ordered  Pain well controlled: tylenol/motrin scheduled, la prn  Lochia within normal limits: continue to monitor   OOB: as able, encourage ambulation  Passing flatus  Voiding spontaneously  DVT ppx: SCDs  Depression affecting pregnancy in first trimester, antepartum  Takes Zoloft 50mg qd      Anticipate discharge POD #3.      Subjective/Objective   Chief Complaint:   Post delivery.     Subjective:   Pain: manageable with motrin/tylenol, she would prefer to avoid la if possible. Tolerating PO: yes. Voiding: yes. Flatus: yes. Bowel Movement: yes. Ambulating: yes. Chest pain: no. Shortness of breath: no. Leg pain: no. Lochia: within normal limits. Infant feeding: breast.    Objective:   Vitals:   Temp:  [98 °F (36.7 °C)-98.2 °F (36.8 °C)] 98 °F (36.7 °C)  HR:  [76-81] 77  BP: (101-122)/(57-76) 122/57  Resp:  [18] 18  SpO2:  [98 %] 98 %  O2 Device: None (Room air)   No intake or output data in the 24 hours ending 25 0614    Physical Exam:   -General: alert and oriented x 3, in no apparent distress  -Cardiovascular: regular rate and rhythm  -Pulmonary: normal effort, clear to auscultation bilaterally  -Abdomen/Pelvis: soft, non-tender, non-distended, no rebound or guarding. Uterine fundus firm and non-tender, -1 cm below the umbilicus. Incision: C/D/I  -Extremities: Nontender    Lab Results   Component Value Date    WBC 11.08 (H) 2025    HGB 9.1 (L) 2025    HCT 28.6 (L) 2025    MCV 85 2025     2025         Janna Gutiérrez MD  PGY-2, OBGYN  2025, 6:14 AM

## 2025-04-14 NOTE — PLAN OF CARE
Problem: POSTPARTUM  Goal: Experiences normal postpartum course  Description: INTERVENTIONS:- Monitor maternal vital signs- Assess uterine involution and lochia  Outcome: Progressing  Goal: Appropriate maternal -  bonding  Description: INTERVENTIONS:- Identify family support- Assess for appropriate maternal/infant bonding -Encourage maternal/infant bonding opportunities- Referral to  or  as needed  Outcome: Progressing  Goal: Establishment of infant feeding pattern  Description: INTERVENTIONS:- Assess breast/bottle feeding- Refer to lactation as needed  Outcome: Progressing  Goal: Incision(s), wounds(s) or drain site(s) healing without S/S of infection  Description: INTERVENTIONS- Assess and document dressing, incision, wound bed, drain sites and surrounding tissue- Provide patient and family education  Outcome: Progressing     Problem: PAIN - ADULT  Goal: Verbalizes/displays adequate comfort level or baseline comfort level  Description: Interventions:- Encourage patient to monitor pain and request assistance- Assess pain using appropriate pain scale- Administer analgesics based on type and severity of pain and evaluate response- Implement non-pharmacological measures as appropriate and evaluate response- Consider cultural and social influences on pain and pain management- Notify physician/advanced practitioner if interventions unsuccessful or patient reports new pain  Outcome: Progressing     Problem: INFECTION - ADULT  Goal: Absence or prevention of progression during hospitalization  Description: INTERVENTIONS:- Assess and monitor for signs and symptoms of infection- Monitor lab/diagnostic results- Monitor all insertion sites, i.e. indwelling lines, tubes, and drains- Monitor endotracheal if appropriate and nasal secretions for changes in amount and color- [place] appropriate cooling/warming therapies per order- Administer medications as ordered- Instruct and encourage patient  and family to use good hand hygiene technique- Identify and instruct in appropriate isolation precautions for identified infection/condition  Outcome: Progressing     Problem: SAFETY ADULT  Goal: Patient will remain free of falls  Description: INTERVENTIONS:- Educate patient/family on patient safety including physical limitations- Instruct patient to call for assistance with activity - Consult OT/PT to assist with strengthening/mobility - Keep Call bell within reach- Keep bed low and locked with side rails adjusted as appropriate- Keep care items and personal belongings within reach  Outcome: Progressing  Goal: Maintain or return to baseline ADL function  Description: INTERVENTIONS:-  Assess patient's ability to carry out ADLs; assess patient's baseline for ADL function and identify physical deficits which impact ability to perform ADLs (bathing, care of mouth/teeth, toileting, grooming, dressing, etc.)- Assess/evaluate cause of self-care deficits - Assess range of motion- Assess patient's mobility; develop plan if impaired- Assess patient's need for assistive devices and provide as appropriate- Encourage maximum independence but intervene and supervise when necessary- Involve family in performance of ADLs- Assess for home care needs following discharge - Consider OT consult to assist with ADL evaluation and planning for discharge- Provide patient education as appropriate  Outcome: Progressing  Goal: Maintains/Returns to pre admission functional level  Description: INTERVENTIONS:- Set and communicate daily mobility goal to care team and patient/family/caregiver. - Collaborate with rehabilitation services on mobility goals if consulted  Outcome: Progressing     Problem: Knowledge Deficit  Goal: Patient/family/caregiver demonstrates understanding of disease process, treatment plan, medications, and discharge instructions  Description: Complete learning assessment and assess knowledge base.Interventions:- Provide  teaching at level of understanding- Provide teaching via preferred learning methods  Outcome: Progressing     Problem: DISCHARGE PLANNING  Goal: Discharge to home or other facility with appropriate resources  Description: INTERVENTIONS:- Identify barriers to discharge w/patient and caregiver- Arrange for needed discharge resources and transportation as appropriate- Identify discharge learning needs (meds, wound care, etc.)- Arrange for interpretive services to assist at discharge as needed- Refer to Case Management Department for coordinating discharge planning if the patient needs post-hospital services based on physician/advanced practitioner order or complex needs related to functional status, cognitive ability, or social support system  Outcome: Progressing

## 2025-04-14 NOTE — TELEPHONE ENCOUNTER
Delfin from [facility] called regarding patients short term disability claim. Needed to know if patient delivered, date, and method. Provided info as needed.

## 2025-04-14 NOTE — CONSULTS
Inpatient Lactation Consult resolved, please refer to previous lactation notes for further details.    Discussed community resources for continued breastfeeding support once discharged home. Encouraged to contact outpatient [facility] as needed.    Encouraged patient to call for any additional assistance, questions, and concerns that may arise before discharge.

## 2025-04-14 NOTE — LACTATION NOTE
This note was copied from a baby's chart.  CONSULT - LACTATION  Baby Girl (Trace Blair 3 days female MRN: [id number]    [facility] AN NURSERY Room / Bed: (N)/(N) Encounter: [id number]    Maternal Information     MOTHER:  Calli Blair  Maternal Age: 34 y.o.  OB History: # 1 - Date: 25, Sex: Female, Weight: 3720 g (8 lb 3.2 oz), GA: 39w0d, Type: , Low Transverse, Apgar1: 8, Apgar5: 9, Living: Living, Birth Comments: None   Previous breast reduction surgery? No    Lactation history:   Has patient previously breast fed: No   How long had patient previously breast fed:     Previous breast feeding complications:       Past Surgical History:   Procedure Laterality Date    NO PAST SURGERIES      [place]  DELIVERY ONLY N/A 2025    Procedure:  SECTION ();  Surgeon: Sanjana Clancy DO;  Location: AN ;  Service: Obstetrics       Birth information:  YOB: 2025   Time of birth: 8:30 AM   Sex: female   Delivery type: , Low Transverse   Birth Weight: 3720 g (8 lb 3.2 oz)   Percent of Weight Change: -10%     Gestational Age: 39w0d   [unfilled]    Reason for Consult:    Reason for Consult: Discharge (routine) - 10 min, Latch Assess (ext) - 20 min    Risk Factors:    Risk Factors: Maternal anatomy (small breasts; small, tan areolas, small nipples; slightly inverted)    Breast and nipple assessment:   Breasts/Nipples  Left Breast: Filling, Firm  Right Breast: Filling, Firm  Left Nipple: Everted, Other (Comment) (more prominent than R)  Right Nipple: Everted  Intervention: Hand expression  Breastfeeding Status: Yes  Breastfeeding Progress: Not yet established, Breastfeeding well (with guidance)    OB Lactation Tools:         Breast Pump:    Breast Pump  Pump: Personal, Manual (Spectra S2)      [place] Assessment:  Not assessed at this time, baby crying at breast. Strong rhythmic suck with gloved finger.    Feeding  assessment: feeding well with guidance and stimulation.  LATCH:  Latch: Grasps breast, tongue down, lips flanged, rhythmic sucking   Audible Swallowing: Spontaneous and intermittent (24 hours old)   Type of Nipple: Everted (After stimulation)   Comfort (Breast/Nipple): Soft/non-tender   Hold (Positioning): Partial assist, teach one side, mother does other, staff holds   LATCH Score: 9       Feeding recommendations:  breast feed on demand every 2-3 hours, watching for early feeding cues. At least 8-12 feedings in 24 hours.    Consulted with Calli to follow up and discuss discharge breastfeeding anticipation guidelines.  Family states that breastfeeding is progressively getting better. Patient's breasts are filling and nipples feel tender d/t frequent feeding. Baby's stools are transitioning in color per family.    Baby was crying at the breast upon encounter. Patient was using personal boppy pillow, proper position was reiterated. Lactation adjusted with permission.   Baby was brought to breast level, and was positioned closer to mother. Hand expression was used to help calm baby at the breast. Baby was able to grasp nipple and latch. Baby had rhythmic sucking, audible swallows noted.   Breast compression and stimulation techniques was reviewed and encouraged. Baby breast fed for a little over 20 minutes before displaying fullness cues. Second breast was offered but baby was not interested.  Educated on nutritive sucking vs non-nutritive sucking.  Discussed how to know when baby is full at the breast.    Reviewed with family to position baby at breast level using pillow support. Discussed to ensure that there is no empty space between mother and baby.  Stressed importance of good alignment with baby's ear, shoulder and hip in a straight line  Encouraged to bring baby to breast and not breast to baby.   Reviewed to compress the breast as if offering a sandwich with fingers and thumb in parallel with baby's lips when  latching baby.  Reiterated to align baby's nose to the nipple and allowing for the baby to open wide, with the baby's chin touching the breast first.   Reviewed that baby's cheeks and chin are touching mother's breast, not seeing the nipple bopping in and out of baby's mouth.  Discussed that if it feels like baby is continuously pinching at the breast, encouraged to break the suction by putting pinky in the corner of the baby's mouth and relatching the baby.    Wet wound care was reviewed. Encouraged organic olive oil or coconut oil. Can use lanolin but monitor for lanolin sensitivity.  Discussed healing properties of breast milk on nipples -hand expressing breast milk on nipples and allowing it to air dry can help heal nipples. Patient is also encouraged to pay close attention to latch and positioning.    Feeding plan:  Calli is encouraged to breastfeed on demand, every 2-3 hours or when baby showing early feeding cues. Reviewed to offer both breasts during a feed. Reviewed that baby can take up to 4 breasts in one feed.  Discussed the importance of ensuring that baby feeds 8-12x in 24 hours and not waiting over 3 hours to feed. Reiterated to watch the baby for hunger and fullness cues.    Reviewed baby's belly size and cluster feeding. Discussed cluster feeding is a normal baby behavior and helps bring in a good milk supply.     Encouraged family to monitor baby's outputs, ensuring baby is reaching goal diapers. Discussed that soiled diapers transition by changing color from black meconium to yellow/seedy by day 5.    Discussed initial engorgement time frame and reviewed engorgement comfort measures.  Encouraged to continue to breastfeed on demand, not waiting too long between feeds. Reviewed to use manual breast pump to relieve engorgement if baby is not due for a feed, or use it before baby latches to soften the breast. Reviewed using warm compress prior to feeds to help with milk flow and ice between feeds  for inflammation.     Discussed community resources for continued breastfeeding support once discharged home. Encouraged to contact with [facility] as needed.    Patient was encouraged to contact lactation for a latch assessment, continued support and/or questions that arise before discharge.      TRIP Desir 4/14/2025 10:02 AM

## 2025-04-14 NOTE — LACTATION NOTE
Lactation Follow Up    Met with Calli who called out for latch assistance. Baby is crying. Parents report they had visitors today. Encouraged hand expressing into baby's mouth. Baby did not calm/latch. Assisted in calming baby and latching baby with hand over hand support in cross cradle. Baby latched and suckled actively. Calli reports initial latch on pain that subsided. Parents report concern that baby did not like taste of nipple butter; discussed using expressed colostrum on her nipples to aid in healing as another option besides nipple butter. Discussed cluster feeding. Encouraged skin to skin contact. Discussed calming baby if upset and meeting early feeding cues. Encouraged to call for lactation support as needed.

## 2025-04-14 NOTE — ASSESSMENT & PLAN NOTE
Continue routine post partum care  , HgB 10.9 -> 9.1, venofer ordered  Pain well controlled: tylenol/motrin scheduled, la prn  Lochia within normal limits: continue to monitor   OOB: as able, encourage ambulation  Passing flatus  Voiding spontaneously  DVT ppx: SCDs

## 2025-04-14 NOTE — CONSULTS
Consultation - Acute Pain   Name: Calli Blair 34 y.o. female I MRN: [id number]  Unit/Bed#: -01 I Date of Admission: 2025   Date of Service: 2025 I Hospital Day: 3   Inpatient consult to Acute Pain Service  Consult performed by: Johnnie Wright PA-C  Consult ordered by: Shivam Amaya MD        Physician Requesting Evaluation: Sanjana Clancy DO   Reason for Evaluation / Principal Problem: Status post LTCS    Assessment & Plan  S/P primary low transverse   Ibuprofen 600 mg p.o. every 6 hours scheduled.  Oxycodone 5 mg p.o. every 4 hours as needed moderate pain or 10 mg p.o. every 4 hours as needed severe pain.  Hydromorphone 0.5 mg IV every 2 hours as needed breakthrough pain.  Would discontinue this if used twice or less in 24 hours.  As needed Narcan in the event that opioid reversal becomes necessary.  Bowel regimen to avoid opioid-induced constipation while on opioid pain medication.  Neuraxial Duramorph:     Methadone and Duramorph Intra-procedure Administrations (last 48 hours)  Morphine (Duramorph) may show additional administrations that are not neuraxial. Review route and comments.      None           Monitoring to continue for 24 hours post Duramorph administration:  Monitor pain, HR, BP, respirations, level of sedation and oxygen levels as ordered  Continuous pulse oximetry (or capnography) for 24 hours.  Notify Anesthesia/Acute Pain Services for the following:  Persistent pruritis, persistent nausea, or if respiratory rate is less than or equal to 10 breaths per minute or if pain is unrelieved or if patient is excessively sedated or O2 Sat less than 90% or EtCO2 outside the parameters of Respiratory Therapy Capnography/EtCO2 policy  No narcotics / sedatives / sleeping agents not ordered by Anesthesia/Acute Pain Service for 24 hours post duramorph    I have discussed the above management plan in detail with the primary service.   Please contact the SecureChat role  for the Acute Pain service with any questions/concerns.      APS will sign off at this time. Thank you for the consult. All opioids and other analgesics to be written at discretion of primary team. Please contact Acute Pain Service - via SecureChat from [phone] with additional questions or concerns. See SecureChat or Amion for additional contacts and after hours information.    History of Present Illness    HPI: Calli Blair is a 34 y.o. year old female who presents with mild lower abdominal pain following low-transverse  on 2025.  Has not required as needed opioids.  No residual effects from Duramorph spinal.    Current pain location(s): Pain Score: 4  Pain Location/Orientation: Other (Comment) (incision)  Pain Scale: Pain Assessment Tool: 0-10  Current Analgesic regimen:  Ibuprofen 600 mg p.o. every 6 hours.  Oxycodone 5 mg p.o. every 4 hours as needed moderate pain or 10 mg p.o. every 4 hours as needed severe pain.  Hydromorphone 0.5 mg IV every 2 hours as needed breakthrough pain.    Pain History: None  Pain Management Physician: None  I have reviewed the patient's controlled substance dispensing history in the Prescription Drug Monitoring Program in compliance with the [facility] regulations before prescribing any controlled substances.     Review of Systems   Gastrointestinal:  Positive for abdominal pain.   All other systems reviewed and are negative.    Historical Information   Past Medical History:   Diagnosis Date    Anxiety     No pertinent past medical history     Varicella     in childhood, vaccinated     Past Surgical History:   Procedure Laterality Date    NO PAST SURGERIES      [place]  DELIV[place] ONLY N/A 2025    Procedure:  SECTION ();  Surgeon: Sanjana Clancy DO;  Location: [facility] ;  Service: Obstetrics     Social History     Tobacco Use    Smoking status: Never     Passive exposure: Never    Smokeless tobacco: Never   Vaping Use    Vaping status: Never Used    Substance and Sexual Activity    Alcohol use: Not Currently     Alcohol/week: 2.0 standard drinks of alcohol     Types: 1 Glasses of wine, 1 Cans of beer per week     Comment: socially-prior to pregnancy    Drug use: Never    Sexual activity: Yes     Partners: Male     Birth control/protection: None     E-Cigarette/Vaping    E-Cigarette Use Never User      E-Cigarette/Vaping Substances    Nicotine No     THC No     CBD No     Flavoring No     Other No     Unknown No      Family History   Problem Relation Age of Onset    Thyroid disease Mother         hypothyroidism    Anxiety disorder Father     No Known Problems Brother     No Known Problems Brother     Dementia Maternal Grandmother     Heart disease Maternal Grandfather     Diabetes Maternal Grandfather     Breast cancer Family     Colon cancer Neg Hx     Ovarian cancer Neg Hx      Social History     Tobacco Use    Smoking status: Never     Passive exposure: Never    Smokeless tobacco: Never   Vaping Use    Vaping status: Never Used   Substance and Sexual Activity    Alcohol use: Not Currently     Alcohol/week: 2.0 standard drinks of alcohol     Types: 1 Glasses of wine, 1 Cans of beer per week     Comment: socially-prior to pregnancy    Drug use: Never    Sexual activity: Yes     Partners: Male     Birth control/protection: None       Current Facility-Administered Medications:     benzocaine-menthol-lanolin-aloe (DERMOPLAST) 20-0.5 % topical spray 1 Application, Q6H PRN    calcium carbonate (TUMS) chewable tablet 1,000 mg, Daily PRN    diphenhydrAMINE (BENADRYL) injection 25 mg, Q6H PRN    docusate sodium (COLACE) capsule 100 mg, BID    hydrocortisone 1 % cream 1 Application, Daily PRN    HYDROmorphone (DILAUDID) injection 0.5 mg, Q2H PRN    [COMPLETED] ketorolac (TORADOL) injection 30 mg, Q6H JIMY **FOLLOWED BY** ibuprofen (MOTRIN) tablet 600 mg, Q6H    lactated ringers infusion, Continuous, Last Rate: 125 mL/hr (04/11/25 2297)    naloxone (NARCAN) 0.04 mg/mL  syringe 0.04 mg, Q1MIN PRN    ondansetron (ZOFRAN) injection 4 mg, Q8H PRN    ondansetron (ZOFRAN) injection 4 mg, Once PRN    oxyCODONE (ROXICODONE) immediate release tablet 10 mg, Q4H PRN    oxyCODONE (ROXICODONE) IR tablet 5 mg, Q4H PRN    sertraline (ZOLOFT) tablet 50 mg, Daily    simethicone (MYLICON) chewable tablet 80 mg, 4x Daily PRN    witch hazel-glycerin (TUCKS) topical pad 1 Pad, Q4H PRN  Prior to Admission Medications   Prescriptions Last Dose Informant Patient Reported? Taking?   Cholecalciferol (Vitamin D3) 75 MCG (3000 UT) TABS 4/10/2025 Self Yes Yes   Sig: Take by mouth   Prenatal MV-Min-Fe Fum-FA-DHA (PRENATAL 1 PO) 4/10/2025 Self Yes Yes   Sig: Take by mouth   magnesium oxide-pyridoxine (BEELITH) 362-20 MG TABS 4/10/2025 Self Yes Yes   Sig: Take 1 tablet by mouth daily   sertraline (ZOLOFT) 50 mg tablet 4/10/2025 Self No Yes   Sig: Take 1 tablet (50 mg total) by mouth daily      Facility-Administered Medications: None     Amoxicillin    Objective :  Temp:  [98 °F (36.7 °C)-98.5 °F (36.9 °C)] 98.5 °F (36.9 °C)  HR:  [71-81] 71  BP: (117-122)/(57-76) 117/68  Resp:  [18-20] 20  SpO2:  [98 %-99 %] 99 %  O2 Device: None (Room air)    Physical Exam  Vitals and nursing note reviewed.   Constitutional:       General: She is awake. She is not in acute distress.     Appearance: She is not ill-appearing, toxic-appearing or diaphoretic.   Skin:     General: Skin is warm and dry.   Neurological:      Mental Status: She is alert and oriented to person, place, and time.      GCS: GCS eye subscore is 4. GCS verbal subscore is 5. GCS motor subscore is 6.   Psychiatric:         Attention and Perception: Attention normal.         Speech: Speech normal.         Behavior: Behavior normal. Behavior is cooperative.          Lab Results: I have reviewed the following results:  CrCl cannot be calculated (Patient's most recent lab result is older than the maximum 7 days allowed.).  Lab Results   Component Value Date    WBC  11.08 (H) 04/11/2025    HGB 9.1 (L) 04/11/2025    HCT 28.6 (L) 04/11/2025     04/11/2025         Component Value Date/Time    K 4.0 06/12/2024 0746    K 4.5 03/22/2023 0847     06/12/2024 0746     03/22/2023 0847    CO2 27 06/12/2024 0746    CO2 26 03/22/2023 0847    BUN 12 06/12/2024 0746    BUN 15 03/22/2023 0847    CREATININE 0.69 06/12/2024 0746         Component Value Date/Time    CALCIUM 8.6 06/12/2024 0746    CALCIUM 9.1 03/22/2023 0847    ALKPHOS 28 (L) 06/12/2024 0746    ALKPHOS 30 (L) 03/22/2023 0847    AST 17 06/12/2024 0746    AST 17 03/22/2023 0847    ALT 12 06/12/2024 0746    ALT 11 03/22/2023 0847    TP 6.6 06/12/2024 0746    TP 7.0 03/22/2023 0847    ALB 4.3 06/12/2024 0746       Imaging Results Review: No pertinent imaging studies reviewed.  Other Study Results Review: No additional pertinent studies reviewed.    Administrative Statements   I have spent a total time of 23 minutes in caring for this patient on the day of the visit/encounter including Risks and benefits of tx options, Instructions for management, Patient and family education, Importance of tx compliance, Risk factor reductions, Impressions, Counseling / Coordination of care, Documenting in the medical record, Reviewing/placing orders in the medical record (including tests, medications, and/or procedures), Obtaining or reviewing history  , and Communicating with other healthcare professionals .

## 2025-04-15 NOTE — UTILIZATION REVIEW
"Mother and baby discharged 2025     NOTIFICATION OF INPATIENT ADMISSION   MATERNITY/DELIVERY AUTHORIZATION REQUEST   SERVICING FACILITY:   [facility] - L&D, [place], NICU  [address] [place] [address]  Tax ID: [id number]  NPI: [id number]   ATTENDING PROVIDER:  Attending Name and NPI#: Sanjana Clancy Do [[id number]]  Address: [address] [place] [address]  Phone: [phone]   ADMISSION INFORMATION:  Place of Service: Inpatient [facility]  Place of Service Code: 21  Inpatient Admission Date/Time: 25  6:13 AM  Discharge Date/Time: 2025  1:19 PM  Admitting Diagnosis Code/Description:  Breech presentation, single or unspecified fetus [O32.1XX0]  39 weeks gestation of pregnancy [Z3A.39]  Pregnancy [Z34.90]  Encounter for  delivery without indication [O82]     Mother: Calli Blair 1990 Estimated Date of Delivery: 25  Delivering clinician: Sanjana Clancy   OB History          1    Para   1    Term   1            AB        Living   1         SAB        IAB        Ectopic        Multiple   0    Live Births   1           Obstetric Comments   Menarche 12              [facility] Name & MRN:   Information for the patient's :  Rossy, Baby Girl (Calli) [[id number]]   [facility] Delivery Information:  Sex: female  Delivered 2025 8:30 AM by , Low Transverse; Gestational Age: 39w0d     Measurements:  Weight: 8 lb 3.2 oz (3720 g);  Height: 20\"    APGAR 1 minute 5 minutes 10 minutes   Totals: 8 9       UTILIZATION REVIEW CONTACT:  Minerva Hanna, Utilization   Network Utilization Review Department  Phone: [phone]  Fax [phone]  Email: [email or web address]  Contact for approvals/pending authorizations, clinical reviews, and discharge.     PHYSICIAN ADVISORY SERVICES:  Medical Necessity Denial & Yfdi-ft-Kenf Review  Phone: [phone]  Fax: " [phone]  Email: [email or web address]     DISCHARGE SUPPORT TEAM:  For Patients Discharge Needs & Updates  Phone: [phone] opt. 2 Fax: [phone]  Email: [email or web address]

## 2025-04-17 ENCOUNTER — HOSPITAL ENCOUNTER (OUTPATIENT)
Facility: HOSPITAL | Age: 35
Setting detail: OBSERVATION
Discharge: HOME | End: 2025-04-18
Attending: EMERGENCY MEDICINE | Admitting: OBSTETRICS & GYNECOLOGY
Payer: COMMERCIAL

## 2025-04-17 ENCOUNTER — NURSE TRIAGE (OUTPATIENT)
Dept: OTHER | Facility: OTHER | Age: 35
End: 2025-04-17

## 2025-04-17 DIAGNOSIS — R03.0 ELEVATED BLOOD PRESSURE READING: Primary | ICD-10-CM

## 2025-04-17 DIAGNOSIS — O13.9 GESTATIONAL HYPERTENSION: ICD-10-CM

## 2025-04-17 DIAGNOSIS — Z98.891 S/P PRIMARY LOW TRANSVERSE C-SECTION: ICD-10-CM

## 2025-04-17 LAB — PLACENTA IN STORAGE: NORMAL

## 2025-04-17 PROCEDURE — TCMXX: Performed by: FAMILY MEDICINE

## 2025-04-17 PROCEDURE — 99283 EMERGENCY DEPT VISIT LOW MDM: CPT

## 2025-04-17 PROCEDURE — 81001 URINALYSIS AUTO W/SCOPE: CPT | Performed by: EMERGENCY MEDICINE

## 2025-04-17 NOTE — Clinical Note
Case was discussed with Dr. Parish and the patient's admission status was agreed to be Admission Status: observation status to the service of Dr. Nicholson.

## 2025-04-18 VITALS
DIASTOLIC BLOOD PRESSURE: 85 MMHG | RESPIRATION RATE: 16 BRPM | SYSTOLIC BLOOD PRESSURE: 132 MMHG | OXYGEN SATURATION: 98 % | TEMPERATURE: 97.8 F | HEART RATE: 86 BPM

## 2025-04-18 PROBLEM — O13.9 GESTATIONAL HYPERTENSION: Status: ACTIVE | Noted: 2025-04-18

## 2025-04-18 LAB
ALBUMIN SERPL BCG-MCNC: 3.5 G/DL (ref 3.5–5)
ALP SERPL-CCNC: 78 U/L (ref 34–104)
ALT SERPL W P-5'-P-CCNC: 36 U/L (ref 7–52)
ANION GAP SERPL CALCULATED.3IONS-SCNC: 8 MMOL/L (ref 4–13)
AST SERPL W P-5'-P-CCNC: 30 U/L (ref 13–39)
ATRIAL RATE: 67 BPM
BACTERIA UR QL AUTO: ABNORMAL /HPF
BASOPHILS # BLD AUTO: 0.01 THOUSANDS/ÂΜL (ref 0–0.1)
BASOPHILS NFR BLD AUTO: 0 % (ref 0–1)
BILIRUB SERPL-MCNC: 0.23 MG/DL (ref 0.2–1)
BILIRUB UR QL STRIP: NEGATIVE
BUN SERPL-MCNC: 18 MG/DL (ref 5–25)
CALCIUM SERPL-MCNC: 8.1 MG/DL (ref 8.4–10.2)
CHLORIDE SERPL-SCNC: 108 MMOL/L (ref 96–108)
CLARITY UR: CLEAR
CO2 SERPL-SCNC: 22 MMOL/L (ref 21–32)
COLOR UR: COLORLESS
CREAT SERPL-MCNC: 0.55 MG/DL (ref 0.6–1.3)
CREAT UR-MCNC: 16.5 MG/DL
EOSINOPHIL # BLD AUTO: 0.25 THOUSAND/ÂΜL (ref 0–0.61)
EOSINOPHIL NFR BLD AUTO: 3 % (ref 0–6)
ERYTHROCYTE [DISTWIDTH] IN BLOOD BY AUTOMATED COUNT: 15.9 % (ref 11.6–15.1)
ERYTHROCYTE [DISTWIDTH] IN BLOOD BY AUTOMATED COUNT: 17.5 % (ref 11.6–15.1)
GFR SERPL CREATININE-BSD FRML MDRD: 122 ML/MIN/1.73SQ M
GLUCOSE SERPL-MCNC: 99 MG/DL (ref 65–140)
GLUCOSE UR STRIP-MCNC: NEGATIVE MG/DL
HCT VFR BLD AUTO: 33.4 % (ref 34.8–46.1)
HCT VFR BLD AUTO: 35.2 % (ref 34.8–46.1)
HGB BLD-MCNC: 10.2 G/DL (ref 11.5–15.4)
HGB BLD-MCNC: 10.9 G/DL (ref 11.5–15.4)
HGB UR QL STRIP.AUTO: ABNORMAL
IMM GRANULOCYTES # BLD AUTO: 0.05 THOUSAND/UL (ref 0–0.2)
IMM GRANULOCYTES NFR BLD AUTO: 1 % (ref 0–2)
KETONES UR STRIP-MCNC: NEGATIVE MG/DL
LEUKOCYTE ESTERASE UR QL STRIP: NEGATIVE
LYMPHOCYTES # BLD AUTO: 2.05 THOUSANDS/ÂΜL (ref 0.6–4.47)
LYMPHOCYTES NFR BLD AUTO: 26 % (ref 14–44)
MCH RBC QN AUTO: 26.8 PG (ref 26.8–34.3)
MCH RBC QN AUTO: 27.3 PG (ref 26.8–34.3)
MCHC RBC AUTO-ENTMCNC: 30.5 G/DL (ref 31.4–37.4)
MCHC RBC AUTO-ENTMCNC: 31 G/DL (ref 31.4–37.4)
MCV RBC AUTO: 88 FL (ref 82–98)
MCV RBC AUTO: 88 FL (ref 82–98)
MONOCYTES # BLD AUTO: 0.52 THOUSAND/ÂΜL (ref 0.17–1.22)
MONOCYTES NFR BLD AUTO: 7 % (ref 4–12)
NEUTROPHILS # BLD AUTO: 4.93 THOUSANDS/ÂΜL (ref 1.85–7.62)
NEUTS SEG NFR BLD AUTO: 63 % (ref 43–75)
NITRITE UR QL STRIP: NEGATIVE
NON-SQ EPI CELLS URNS QL MICRO: ABNORMAL /HPF
NRBC BLD AUTO-RTO: 0 /100 WBCS
P AXIS: 55 DEGREES
PH UR STRIP.AUTO: 5.5 [PH]
PLATELET # BLD AUTO: 190 THOUSANDS/UL (ref 149–390)
PLATELET # BLD AUTO: 248 THOUSANDS/UL (ref 149–390)
PMV BLD AUTO: 10.8 FL (ref 8.9–12.7)
PMV BLD AUTO: 9.4 FL (ref 8.9–12.7)
POTASSIUM SERPL-SCNC: 3.8 MMOL/L (ref 3.5–5.3)
PR INTERVAL: 158 MS
PROT SERPL-MCNC: 6.4 G/DL (ref 6.4–8.4)
PROT UR STRIP-MCNC: NEGATIVE MG/DL
PROT UR-MCNC: <4 MG/DL
QRS AXIS: 72 DEGREES
QRSD INTERVAL: 78 MS
QT INTERVAL: 450 MS
QTC INTERVAL: 475 MS
RBC # BLD AUTO: 3.8 MILLION/UL (ref 3.81–5.12)
RBC # BLD AUTO: 3.99 MILLION/UL (ref 3.81–5.12)
RBC #/AREA URNS AUTO: ABNORMAL /HPF
SODIUM SERPL-SCNC: 138 MMOL/L (ref 135–147)
SP GR UR STRIP.AUTO: 1 (ref 1–1.03)
T WAVE AXIS: 66 DEGREES
UROBILINOGEN UR STRIP-ACNC: <2 MG/DL
VENTRICULAR RATE: 67 BPM
WBC # BLD AUTO: 6.84 THOUSAND/UL (ref 4.31–10.16)
WBC # BLD AUTO: 7.81 THOUSAND/UL (ref 4.31–10.16)
WBC #/AREA URNS AUTO: ABNORMAL /HPF

## 2025-04-18 PROCEDURE — 96360 HYDRATION IV INFUSION INIT: CPT

## 2025-04-18 PROCEDURE — 84156 ASSAY OF PROTEIN URINE: CPT | Performed by: EMERGENCY MEDICINE

## 2025-04-18 PROCEDURE — 80053 COMPREHEN METABOLIC PANEL: CPT | Performed by: EMERGENCY MEDICINE

## 2025-04-18 PROCEDURE — 85025 COMPLETE CBC W/AUTO DIFF WBC: CPT | Performed by: EMERGENCY MEDICINE

## 2025-04-18 PROCEDURE — NC001 PR NO CHARGE: Performed by: OBSTETRICS & GYNECOLOGY

## 2025-04-18 PROCEDURE — 93010 ELECTROCARDIOGRAM REPORT: CPT | Performed by: INTERNAL MEDICINE

## 2025-04-18 PROCEDURE — 36415 COLL VENOUS BLD VENIPUNCTURE: CPT | Performed by: EMERGENCY MEDICINE

## 2025-04-18 PROCEDURE — 93005 ELECTROCARDIOGRAM TRACING: CPT

## 2025-04-18 PROCEDURE — 82570 ASSAY OF URINE CREATININE: CPT | Performed by: EMERGENCY MEDICINE

## 2025-04-18 PROCEDURE — 85027 COMPLETE CBC AUTOMATED: CPT

## 2025-04-18 PROCEDURE — 96361 HYDRATE IV INFUSION ADD-ON: CPT

## 2025-04-18 RX ORDER — DIPHENHYDRAMINE HCL 25 MG
25 TABLET ORAL EVERY 6 HOURS PRN
Status: DISCONTINUED | OUTPATIENT
Start: 2025-04-18 | End: 2025-04-18 | Stop reason: HOSPADM

## 2025-04-18 RX ORDER — IBUPROFEN 600 MG/1
600 TABLET, FILM COATED ORAL EVERY 6 HOURS
Status: DISCONTINUED | OUTPATIENT
Start: 2025-04-18 | End: 2025-04-18 | Stop reason: HOSPADM

## 2025-04-18 RX ORDER — NIFEDIPINE 30 MG
30 TABLET, EXTENDED RELEASE ORAL DAILY
Qty: 30 TABLET | Refills: 0 | Status: SHIPPED | OUTPATIENT
Start: 2025-04-19 | End: 2025-04-23

## 2025-04-18 RX ORDER — ACETAMINOPHEN 325 MG/1
650 TABLET ORAL EVERY 4 HOURS PRN
Status: DISCONTINUED | OUTPATIENT
Start: 2025-04-18 | End: 2025-04-18 | Stop reason: HOSPADM

## 2025-04-18 RX ORDER — DOCUSATE SODIUM 100 MG/1
100 CAPSULE, LIQUID FILLED ORAL 2 TIMES DAILY
Status: DISCONTINUED | OUTPATIENT
Start: 2025-04-18 | End: 2025-04-18 | Stop reason: HOSPADM

## 2025-04-18 RX ORDER — BENZOCAINE/MENTHOL 6 MG-10 MG
1 LOZENGE MUCOUS MEMBRANE DAILY PRN
Status: DISCONTINUED | OUTPATIENT
Start: 2025-04-18 | End: 2025-04-18 | Stop reason: HOSPADM

## 2025-04-18 RX ORDER — OXYCODONE HYDROCHLORIDE 5 MG/1
5 TABLET ORAL EVERY 4 HOURS PRN
Status: DISCONTINUED | OUTPATIENT
Start: 2025-04-18 | End: 2025-04-18 | Stop reason: HOSPADM

## 2025-04-18 RX ORDER — CALCIUM CARBONATE 500 MG/1
1000 TABLET, CHEWABLE ORAL DAILY PRN
Status: DISCONTINUED | OUTPATIENT
Start: 2025-04-18 | End: 2025-04-18 | Stop reason: HOSPADM

## 2025-04-18 RX ORDER — NIFEDIPINE 30 MG/1
30 TABLET, EXTENDED RELEASE ORAL DAILY
Status: DISCONTINUED | OUTPATIENT
Start: 2025-04-18 | End: 2025-04-18 | Stop reason: HOSPADM

## 2025-04-18 RX ORDER — ONDANSETRON 2 MG/ML
4 INJECTION INTRAMUSCULAR; INTRAVENOUS EVERY 8 HOURS PRN
Status: DISCONTINUED | OUTPATIENT
Start: 2025-04-18 | End: 2025-04-18 | Stop reason: HOSPADM

## 2025-04-18 RX ORDER — ACETAMINOPHEN 325 MG/1
650 TABLET ORAL EVERY 4 HOURS PRN
Start: 2025-04-18

## 2025-04-18 RX ADMIN — SODIUM CHLORIDE 1000 ML: 0.9 INJECTION, SOLUTION INTRAVENOUS at 00:10

## 2025-04-18 RX ADMIN — DOCUSATE SODIUM 100 MG: 100 CAPSULE, LIQUID FILLED ORAL at 08:32

## 2025-04-18 RX ADMIN — IBUPROFEN 600 MG: 600 TABLET, FILM COATED ORAL at 10:23

## 2025-04-18 RX ADMIN — Medication 1 TABLET: at 08:32

## 2025-04-18 RX ADMIN — NIFEDIPINE 30 MG: 30 TABLET, FILM COATED, EXTENDED RELEASE ORAL at 05:07

## 2025-04-18 RX ADMIN — IBUPROFEN 600 MG: 600 TABLET, FILM COATED ORAL at 05:07

## 2025-04-18 NOTE — H&P
H & P- Obstetrics   Calli Blair 34 y.o. female MRN: [id number]  Unit/Bed#: ED-37 Encounter: [id number]    Assessment: 34 y.o.  at 39w0d admitted for postpartum gestational hypertension.      Plan:   Depression affecting pregnancy in first trimester, antepartum  Assessment & Plan  Home Zoloft 50 mg daily ordered    S/P primary low transverse   Assessment & Plan  Routine postpartum care  Tylenol/Motrin/Roxicodone 5mg as needed  Patient to be admitted to postpartum unit   to be with patient as needed      * Gestational hypertension  Assessment & Plan  Newly diagnosed in the postpartum period  Preeclampsia labs within normal limits  With nonsustained severe range blood pressures in the emergency room and persistently elevated blood pressures subsequently  Initiate Procardia XL 30 mg daily  and will uptitrate as needed  Patient denies signs or symptoms of preeclampsia  Aware of need for admission likely at least until           Discussed case and plan w/ Dr. Nicholson      Chief Complaint: high Blood pressures at home    HPI: Calli Blair is a 34 y.o.  now postop day 6 following primary low-transverse  section in the setting of breech presentation who presented to the ED following elevated blood pressures at home.   Of note throughout her pregnancy and hospital admission for delivery she did not meet criteria for any hypertensive diagnoses.   On initial presentation to the emergency room she was noted to have severe range blood pressures.  While these were not sustained and did not persist, all of her blood pressures were noted to be elevated.  Preeclampsia labs were collected and noted to be within normal limits.   However given this new persistent elevation of her blood pressures, the recommendation was made to admit patient for observation and initiation of Procardia.   Patient amenable to this plan.    Patient Active Problem List   Diagnosis    Pregnancy resulting from  in vitro fertilization in third trimester    Subclinical hypothyroidism    S/P primary low transverse     Thyroid disease affecting pregnancy    Depression affecting pregnancy in first trimester, antepartum    Vaginal bleeding    Breech presentation, no version    Gestational hypertension           Review of Systems   Constitutional:  Negative for chills and fever.   Respiratory:  Negative for cough, shortness of breath and wheezing.    Cardiovascular:  Negative for chest pain and leg swelling.   Gastrointestinal:  Negative for abdominal pain, diarrhea, nausea and vomiting.   Genitourinary:  Negative for pelvic pain, vaginal bleeding and vaginal discharge.   Musculoskeletal:  Negative for back pain.   Neurological:  Negative for weakness, light-headedness and headaches.       OB Hx:  OB History    Para Term  AB Living   1 1 1   1   SAB IAB Ectopic Multiple Live Births      0 1      # Outcome Date GA Lbr Jon/2nd Weight Sex Type Anes PTL Lv   1 Term 25 39w0d  3720 g (8 lb 3.2 oz) F CS-LTranv Spinal  SCOTT      Obstetric Comments   Menarche 12        Past Medical Hx:  Past Medical History:   Diagnosis Date    Anxiety     No pertinent past medical history     Varicella     in childhood, vaccinated       Past Surgical hx:  Past Surgical History:   Procedure Laterality Date    NO PAST SURGERIES      [place]  DELIVERY ONLY N/A 2025    Procedure:  SECTION ();  Surgeon: Sanjana Clancy DO;  Location: [facility] ;  Service: Obstetrics         Allergies   Allergen Reactions    Amoxicillin Rash     Rash- in childhood       Not in a hospital admission.    Objective:  Temp:  [97.8 °F (36.6 °C)] 97.8 °F (36.6 °C)  HR:  [61-76] 73  BP: (130-165)/(72-94) 139/74  Resp:  [16-18] 16  SpO2:  [96 %-100 %] 97 %  O2 Device: None (Room air)  There is no height or weight on file to calculate BMI.     Physical Exam:  Physical Exam  Constitutional:       Appearance: Normal appearance.    Cardiovascular:      Rate and Rhythm: Normal rate and regular rhythm.   Pulmonary:      Effort: Pulmonary effort is normal. No respiratory distress.   Abdominal:      Palpations: Abdomen is soft.      Tenderness: There is no abdominal tenderness.   Musculoskeletal:         General: No swelling or tenderness.   Neurological:      General: No focal deficit present.      Mental Status: She is alert and oriented to person, place, and time.   Skin:     General: Skin is warm and dry.   Vitals reviewed.            FHT:       TOCO:        Lab Results   Component Value Date    WBC 7.81 04/18/2025    HGB 10.2 (L) 04/18/2025    HCT 33.4 (L) 04/18/2025     04/18/2025     Lab Results   Component Value Date    K 3.8 04/18/2025     04/18/2025    CO2 22 04/18/2025    BUN 18 04/18/2025    CREATININE 0.55 (L) 04/18/2025    AST 30 04/18/2025    ALT 36 04/18/2025       <2 Midnights  OBSERVATION    Signature/Title: Luz Parish MD  Date: 4/18/2025  Time: 3:42 AM

## 2025-04-18 NOTE — ED PROVIDER NOTES
Time reflects when diagnosis was documented in both MDM as applicable and the Disposition within this note       Time User Action Codes Description Comment    2025  1:47 AM Stephanie Lopez Add [R03.0] Elevated blood pressure reading     2025  1:49 AM Stephanie Lopez Add [Z39.2] Postpartum state           ED Disposition       ED Disposition   Admit    Condition   Stable    Date/Time     2:25 AM    Comment   Case was discussed with Dr. Parish and the patient's admission status was agreed to be Admission Status: observation status to the service of Dr. Nicholson.               Assessment & Plan       Medical Decision Making  34-year-old female with a PMH of recent  6 days ago presenting for elevated BP reading with an SBP in the 160s.      Differential diagnosis including but not limited to: Preeclampsia, eclampsia, postpartum hypertension, hypertension, end organ damage, renal failure, metabolic abnormality.    Patient presents afebrile with initial BP of 165/80.  Otherwise well-appearing and denies any associated symptoms with this new elevated blood pressure reading.  No acute findings on physical exam. EKG shows NSR at 67 bpm. Pre-eclampsia work-up negative with no evidence of thrombocytopenia, elevated liver enzymes, elevated creatinine, or elevated urine protein/Cr ratio.  BP improving while in ED, but averaging with an SBP in the 130-140s. Patient evaluated by Dr. Parish with the obgyn team and recommending admission for ongoing monitoring, which the patient is agreeable to. Patient stable and accepted to observation status under the service of Dr. Nicholson.    Amount and/or Complexity of Data Reviewed  Labs: ordered.    Risk  Decision regarding hospitalization.        ED Course as of 25 0226      0116 Updated patient regarding reassuring work-up.  Pending official ob recs on whether to start procardia for ongoing mild HTN.   Patient resting comfortably.        Medications   sodium chloride 0.9 % bolus 1,000 mL (1,000 mL Intravenous New Bag 25 0010)       ED Risk Strat Scores                    No data recorded                            History of Present Illness       Chief Complaint   Patient presents with    Hypertension     Pt 6 days postpartum. Pt states she has been monitoring her BP and noticed at 1030 it was 160 systolic. Pt denies headaches, numbness/ tingling/ blurry vision. No other complaints        Past Medical History:   Diagnosis Date    Anxiety     No pertinent past medical history     Varicella     in childhood, vaccinated      Past Surgical History:   Procedure Laterality Date    NO PAST SURGERIES      [place]  DELIVERY ONLY N/A 2025    Procedure:  SECTION ();  Surgeon: Sanjana Clancy DO;  Location: [facility] ;  Service: Obstetrics      Family History   Problem Relation Age of Onset    Thyroid disease Mother         hypothyroidism    Anxiety disorder Father     No Known Problems Brother     No Known Problems Brother     Dementia Maternal Grandmother     Heart disease Maternal Grandfather     Diabetes Maternal Grandfather     Breast cancer Family     Colon cancer Neg Hx     Ovarian cancer Neg Hx       Social History     Tobacco Use    Smoking status: Never     Passive exposure: Never    Smokeless tobacco: Never   Vaping Use    Vaping status: Never Used   Substance Use Topics    Alcohol use: Not Currently     Alcohol/week: 2.0 standard drinks of alcohol     Types: 1 Glasses of wine, 1 Cans of beer per week     Comment: socially-prior to pregnancy    Drug use: Never      E-Cigarette/Vaping    E-Cigarette Use Never User       E-Cigarette/Vaping Substances    Nicotine No     THC No     CBD No     Flavoring No     Other No     Unknown No       I have reviewed and agree with the history as documented.     34-year-old female with a PMH of recent  6 days ago presenting for elevated BP reading with an SBP in the 160s.   Patient states that she has been checking her blood pressure daily since giving birth and has been averaging around her baseline 120s over 70s, until this evening.  Denies any headaches, vision changes, chest pain, SOB, nausea, vomiting, abdominal pain, or extremity edema.  Denies any history of preeclampsia or elevated blood pressures during this recent pregnancy. Also endorsing mild nasal congestion and cough, denies any associated fevers, chills, difficulty breathing, or sick contacts.      Hypertension  Associated symptoms: no abdominal pain, no chest pain, no confusion, no dizziness, no fever, no headaches, no hematuria, no nausea, no palpitations, no shortness of breath, not vomiting and no weakness        Review of Systems   Constitutional:  Negative for chills and fever.   HENT:  Positive for congestion.    Eyes:  Negative for visual disturbance.   Respiratory:  Positive for cough. Negative for shortness of breath.    Cardiovascular:  Negative for chest pain and palpitations.   Gastrointestinal:  Negative for abdominal pain, diarrhea, nausea and vomiting.   Genitourinary:  Negative for dysuria and hematuria.        Mild vaginal spotting post-.   Musculoskeletal:  Negative for arthralgias and myalgias.   Skin:  Negative for rash.   Neurological:  Negative for dizziness, weakness, light-headedness, numbness and headaches.   Psychiatric/Behavioral:  Negative for confusion.    All other systems reviewed and are negative.          Objective       ED Triage Vitals   Temperature Pulse Blood Pressure Respirations SpO2 Patient Position - Orthostatic VS   25   97.8 °F (36.6 °C) 66 (S) 165/88 18 100 % Sitting      Temp Source Heart Rate Source BP Location FiO2 (%) Pain Score    25 -- --    Oral Monitor Right arm        Vitals      Date and Time Temp Pulse SpO2 Resp BP Pain Score FACES Pain  Rating User   25 0145 -- 61 97 % 16 130/79 -- --    25 0115 -- 64 97 % 16 147/80 -- --    25 0107 -- 64 97 % -- 130/75 -- --    25 0054 -- 64 98 % -- 148/86 -- --    25 0040 -- 63 97 % -- 133/74 -- --    25 0030 -- 65 97 % 16 136/79 -- --    25 0020 -- 71 97 % -- 147/82 -- --    25 0016 -- 72 96 % -- 139/76 -- --    25 0000 -- 76 97 % 18 148/72 -- --    25 2356 -- -- -- -- 152/84 -- --    25 2338 97.8 °F (36.6 °C) -- -- -- -- -- --    25 2337 -- 66 100 % 18  165/88 -- --             Physical Exam  Vitals and nursing note reviewed.   Constitutional:       General: She is not in acute distress.     Appearance: Normal appearance. She is normal weight. She is not ill-appearing, toxic-appearing or diaphoretic.   HENT:      Head: Normocephalic and atraumatic.      Right Ear: External ear normal.      Left Ear: External ear normal.      Nose: Nose normal.      Mouth/Throat:      Mouth: Mucous membranes are moist.   Eyes:      General: No scleral icterus.        Right eye: No discharge.         Left eye: No discharge.      Extraocular Movements: Extraocular movements intact.      Conjunctiva/sclera: Conjunctivae normal.   Cardiovascular:      Rate and Rhythm: Normal rate and regular rhythm.      Heart sounds: Normal heart sounds. No murmur heard.  Pulmonary:      Effort: Pulmonary effort is normal. No respiratory distress.      Breath sounds: Normal breath sounds. No wheezing, rhonchi or rales.   Abdominal:      General: Abdomen is flat. There is no distension.      Palpations: Abdomen is soft.      Tenderness: There is no abdominal tenderness. There is no guarding or rebound.      Comments: Well-healing lower abdominal  scar, no erythema, clean, dry, and intact.   Musculoskeletal:         General: Normal range of motion.      Cervical back: Normal range of motion.      Right lower leg: No edema.      Left lower leg: No  edema.   Skin:     General: Skin is warm and dry.      Capillary Refill: Capillary refill takes less than 2 seconds.   Neurological:      General: No focal deficit present.      Mental Status: She is alert and oriented to person, place, and time. Mental status is at baseline.   Psychiatric:         Mood and Affect: Mood normal.         Behavior: Behavior normal.         Results Reviewed       Procedure Component Value Units Date/Time    Protein / creatinine ratio, urine [[id number]] Collected: 04/18/25 0004    Lab Status: Final result Specimen: Urine, Clean Catch Updated: 04/18/25 0040     Creatinine, Ur 16.5 mg/dL      Protein Urine Random <4.0 mg/dL      Prot/Creat Ratio, Ur --    Comprehensive metabolic panel [[id number]]  (Abnormal) Collected: 04/18/25 0003    Lab Status: Final result Specimen: Blood from Arm, Right Updated: 04/18/25 0034     Sodium 138 mmol/L      Potassium 3.8 mmol/L      Chloride 108 mmol/L      CO2 22 mmol/L      ANION GAP 8 mmol/L      BUN 18 mg/dL      Creatinine 0.55 mg/dL      Glucose 99 mg/dL      Calcium 8.1 mg/dL      AST 30 U/L      ALT 36 U/L      Alkaline Phosphatase 78 U/L      Total Protein 6.4 g/dL      Albumin 3.5 g/dL      Total Bilirubin 0.23 mg/dL      eGFR 122 ml/min/1.73sq m     Narrative:      National Kid[employer] Foundation guidelines for Chronic Kidney Disease (CKD):     Stage 1 with normal or high GFR (GFR > 90 mL/min/1.73 square meters)    Stage 2 Mild CKD (GFR = 60-89 mL/min/1.73 square meters)    Stage 3A Moderate CKD (GFR = 45-59 mL/min/1.73 square meters)    Stage 3B Moderate CKD (GFR = 30-44 mL/min/1.73 square meters)    Stage 4 Severe CKD (GFR = 15-29 mL/min/1.73 square meters)    Stage 5 End Stage CKD (GFR <15 mL/min/1.73 square meters)  Note: GFR calculation is accurate only with a steady state creatinine    CBC and differential [[id number]]  (Abnormal) Collected: 04/18/25 0003    Lab Status: Final result Specimen: Blood from Arm, Right Updated: 04/18/25 0015      WBC 7.81 Thousand/uL      RBC 3.80 Million/uL      Hemoglobin 10.2 g/dL      Hematocrit 33.4 %      MCV 88 fL      MCH 26.8 pg      MCHC 30.5 g/dL      RDW 15.9 %      MPV 9.4 fL      Platelets 248 Thousands/uL      nRBC 0 /100 WBCs      Segmented % 63 %      Immature Grans % 1 %      Lymphocytes % 26 %      Monocytes % 7 %      Eosinophils Relative 3 %      Basophils Relative 0 %      Absolute Neutrophils 4.93 Thousands/µL      Absolute Immature Grans 0.05 Thousand/uL      Absolute Lymphocytes 2.05 Thousands/µL      Absolute Monocytes 0.52 Thousand/µL      Eosinophils Absolute 0.25 Thousand/µL      Basophils Absolute 0.01 Thousands/µL     Urine Microscopic [[id number]]  (Abnormal) Collected: 04/17/25 2357    Lab Status: Final result Specimen: Urine, Clean Catch Updated: 04/18/25 0014     RBC, UA 1-2 /hpf      WBC, UA 2-4 /hpf      Epithelial Cells Occasional /hpf      Bacteria, UA None Seen /hpf     UA w Reflex to Microscopic w Reflex to Culture [[id number]]  (Abnormal) Collected: 04/17/25 2357    Lab Status: Final result Specimen: Urine, Clean Catch Updated: 04/18/25 0013     Color, UA Colorless     Clarity, UA Clear     Specific Gravity, UA 1.005     pH, UA 5.5     Leukocytes, UA Negative     Nitrite, UA Negative     Protein, UA Negative mg/dl      Glucose, UA Negative mg/dl      Ketones, UA Negative mg/dl      Urobilinogen, UA <2.0 mg/dl      Bilirubin, UA Negative     Occult Blood, UA Small            No orders to display       ECG 12 Lead Documentation Only    Date/Time: 4/18/2025 1:30 AM    Performed by: Stephanie Lopez MD  Authorized by: Stephanie Lopez MD    Indications / Diagnosis:  Elevated BP  ECG reviewed by me, the ED Provider: yes    Patient location:  ED  Previous ECG:     Previous ECG:  Unavailable  Interpretation:     Interpretation: normal    Rate:     ECG rate:  67 bpm    ECG rate assessment: normal    Rhythm:     Rhythm: sinus rhythm    Ectopy:     Ectopy: none    QRS:     QRS axis:   Normal    QRS intervals:  Normal  Conduction:     Conduction: normal    ST segments:     ST segments:  Normal  T waves:     T waves: normal    Comments:      Mildly prolonged QTc 475 ms.      ED Medication and Procedure Management   Prior to Admission Medications   Prescriptions Last Dose Informant Patient Reported? Taking?   Cholecalciferol (Vitamin D3) 75 MCG (3000 UT) TABS  Self Yes No   Sig: Take by mouth   Prenatal MV-Min-Fe Fum-FA-DHA (PRENATAL 1 PO)  Self Yes No   Sig: Take by mouth   ibuprofen (MOTRIN) 600 mg tablet   No No   Sig: Take 1 tablet (600 mg total) by mouth every 6 (six) hours   magnesium oxide-pyridoxine (BEELITH) 362-20 MG TABS  Self Yes No   Sig: Take 1 tablet by mouth daily   oxyCODONE (Roxicodone) 5 immediate release tablet   No No   Sig: Take 1 tablet (5 mg total) by mouth every 4 (four) hours as needed for moderate pain for up to 10 days Max Daily Amount: 30 mg   sertraline (ZOLOFT) 50 mg tablet  Self No No   Sig: Take 1 tablet (50 mg total) by mouth daily      Facility-Administered Medications: None     Patient's Medications   Discharge Prescriptions    No medications on file     No discharge procedures on file.  ED SEPSIS DOCUMENTATION   Time reflects when diagnosis was documented in both MDM as applicable and the Disposition within this note       Time User Action Codes Description Comment    4/18/2025  1:47 AM Stephanie Lopez [R03.0] Elevated blood pressure reading     4/18/2025  1:49 AM Stephanie Lopez [Z39.2] Postpartum state                  Stephanie Lopez MD  04/18/25 0226

## 2025-04-18 NOTE — TELEPHONE ENCOUNTER
"FOLLOW UP: Please follow up with patient when office is open.    REASON FOR CONVERSATION: Postpartum Complications    SYMPTOMS: High blood pressure 160/93 at home. 6 days postpartum. Denies headache, vision changes, swelling, difficulty breathing, or chest pain. Patient reports a dry cough.    OTHER: Contacted on call provider who recommended ED evaluation at [facility].    DISPOSITION:  Now (overriding Go to ED Now (or PCP Triage))  Patient agreeable and on her way to [facility].      Reason for Disposition   Systolic BP >= 160 OR Diastolic >= 110    Answer Assessment - Initial Assessment Questions  1. BLOOD PRESSURE: \"What is your blood pressure?\" \"Did you take at least two measurements 5 minutes apart?\"        160/93. Patient has been monitoring her BP at home at at 2100 it was 144/85 and has been rising.    2. ONSET: \"When did you take your blood pressure?\"        Now    3. HOW: \"How did you take your blood pressure?\" (e.g., automatic home BP monitor, visiting nurse)        Home BP monitor    4. HISTORY: \"Do you have a history of high blood pressure?\" \"Were you diagnosed with preeclampsia during this or previous pregnancies?\"        Denies    5. MEDICINES: \"Are you taking any medicines for blood pressure?\" \"Have you missed any doses recently?\"        Denies    6. DELIVERY: \"When was your delivery date?\" \"Vaginal delivery or ?\" \"How many babies?\"  (e.g., single baby, twins)        25  ; single baby    7. OTHER SYMPTOMS: \"Do you have any other symptoms?\" (e.g., abdomen pain, chest pain, difficulty breathing, hand or face swelling, headache, vision changes)        Denies headaches, chest pain, difficulty breathing, vision changes, swelling.  Patient does report a dry cough.    Protocols used: Postpartum - High Blood Pressure-Adult-AH    "

## 2025-04-18 NOTE — ED NOTES
Spoke with PACS to inform that patient is being discharged from the ER and doesn't need the bed.      Annmarie Tim RN  04/18/25 8724

## 2025-04-18 NOTE — ED ATTENDING ATTESTATION
2025  IAdan DO, saw and evaluated the patient. I have discussed the patient with the resident/non-physician practitioner and agree with the resident's/non-physician practitioner's findings, Plan of Care, and MDM as documented in the resident's/non-physician practitioner's note, except where noted. All available labs and Radiology studies were reviewed.  I was present for key portions of any procedure(s) performed by the resident/non-physician practitioner and I was immediately available to provide assistance.       At this point I agree with the current assessment done in the Emergency Department.  I have conducted an independent evaluation of this patient a history and physical is as follows:    MDM: 34yoF presenting to ER w/ potential pre-eclampsia. Asympatomatic to exclude severe features, eclampsia. CBC, CMP, prot/creat wnl. EKG NSR. Reviewed with OB who request obs and note they'll medicate. Pt agreeable to plan.     ED Course  ED Course as of 25 0212      0013 34yoF,  6 days ago, presenting to ER asymptomatic with home vitals in HTN range 150-150 systolic. Pt PT for [facility]. Notes pain s/p  improving daily. No change PO intake, change bowel or bladder, CP, SOB, HA, vision change. Notes nasal congestion paired w/ infrequent dry cough which developed simultaneously over last few days.     HTN, VS otherwise stable. Pt resting comfortably. AAOX4. NAD. Slight nasal congestion. Lungs CTA. No WOB. Heart RRR. No mgr. Abd soft nontender. C section well healing with minimal tenderness appropraite for course. No surrounding erythema or subq fluid collections. No dehiscence. BLE without edema.    0021 EKG NSR without signs of ischemia, infarction, arrhythmia. No old.          Critical Care Time  Procedures

## 2025-04-18 NOTE — ASSESSMENT & PLAN NOTE
Routine postpartum care  Tylenol/Motrin/Roxicodone 5mg as needed  Patient to be admitted to postpartum unit   to be with patient as needed

## 2025-04-18 NOTE — ED NOTES
Breast pump and supplies provided for patient at this time - patient denies further needs.      Caren Roberts RN  04/18/25 0434

## 2025-04-18 NOTE — ASSESSMENT & PLAN NOTE
Newly diagnosed in the postpartum period  Preeclampsia labs within normal limits  With nonsustained severe range blood pressures in the emergency room and persistently elevated blood pressures subsequently  Initiate Procardia XL 30 mg daily 4/18 and will uptitrate as needed  Patient denies signs or symptoms of preeclampsia  Aware of need for admission likely at least until 4/19

## 2025-04-21 ENCOUNTER — CLINICAL SUPPORT (OUTPATIENT)
Dept: OBGYN CLINIC | Facility: CLINIC | Age: 35
End: 2025-04-21

## 2025-04-21 VITALS
DIASTOLIC BLOOD PRESSURE: 80 MMHG | HEIGHT: 64 IN | BODY MASS INDEX: 24.24 KG/M2 | SYSTOLIC BLOOD PRESSURE: 114 MMHG | WEIGHT: 142 LBS

## 2025-04-21 DIAGNOSIS — Z01.30 BLOOD PRESSURE CHECK: Primary | ICD-10-CM

## 2025-04-21 NOTE — PROGRESS NOTES
BP Check    Patient's Name: Calli Blair  : 1990     BP Today: blood pressure (114/80)          Diagnosis: Gestational HTN - Seen at L&D on 2025 for Elevated blood pressure       Current Medications: Nifedipine (Procardia) Dose: 30 mg       Currently Pregnant NO   Postpartum YES   Delivery Method: , Low Transverse   Delivery Date and Time: 2025 8:30 AM       Pt stated that she did not take her morning dose of Nifedipine (Adalat) 30 mg.     Provider’s Recommendations (Lillie HERNÁNDEZ)     -Discontinue procardia 30 mg   -RTO for incision check, scheduled on 2025 with Dr. Clancy.

## 2025-04-23 ENCOUNTER — POSTPARTUM VISIT (OUTPATIENT)
Dept: OBGYN CLINIC | Facility: CLINIC | Age: 35
End: 2025-04-23

## 2025-04-23 VITALS
SYSTOLIC BLOOD PRESSURE: 118 MMHG | HEIGHT: 64 IN | WEIGHT: 140 LBS | DIASTOLIC BLOOD PRESSURE: 66 MMHG | BODY MASS INDEX: 23.9 KG/M2

## 2025-04-23 DIAGNOSIS — O13.9 GESTATIONAL HYPERTENSION, ANTEPARTUM: ICD-10-CM

## 2025-04-23 DIAGNOSIS — Z98.891 S/P PRIMARY LOW TRANSVERSE C-SECTION: ICD-10-CM

## 2025-04-23 DIAGNOSIS — Z48.89 POSTOPERATIVE VISIT: Primary | ICD-10-CM

## 2025-04-23 PROCEDURE — 99024 POSTOP FOLLOW-UP VISIT: CPT | Performed by: OBSTETRICS & GYNECOLOGY

## 2025-04-23 NOTE — PROGRESS NOTES
Assessment   Post partum visit: incision check   S/p  delivery.      Doing well postoperatively.      Plan    1. Continue current medications.  2. Wound care discussed.  3. Increase activity as tolerated.   4. Anticipated return to work: 8 - 12 weeks post partum.  5. Follow up at 4-6 weeks post delivery for postpartum checkup.  6. Breastfeeding support through [employer] [facility] lactation as needed.         Subjective    Calli Blair is a 34 y.o. y.o. female who presents for an incision check.       Delivery Method: , Low Transverse   Delivery Date and Time: 2025 8:30 AM  Delivery Attending: Sanjana Clancy  Gestational Age at delivery: 39w0d   Route of Delivery: , Low Transverse    She is eating a regular diet without difficulty.   Bowel movements are normal.   Pain is controlled with current analgesics. Medications being used: acetaminophen.   Bleeding: less flow than a normal period    Breast feeding: yes. Patient is Pumping.     The following portions of the patient's history were reviewed and updated as appropriate: allergies, current medications, past family history, past medical history, past social history, past surgical history, and problem list.    Allergies  Amoxicillin    Medications    Current Outpatient Medications:     acetaminophen (TYLENOL) 325 mg tablet, Take 2 tablets (650 mg total) by mouth every 4 (four) hours as needed for mild pain, Disp: , Rfl:     Cholecalciferol (Vitamin D3) 75 MCG (3000 UT) TABS, Take by mouth, Disp: , Rfl:     Prenatal MV-Min-Fe Fum-FA-DHA (PRENATAL 1 PO), Take by mouth, Disp: , Rfl:     sertraline (ZOLOFT) 50 mg tablet, Take 1 tablet (50 mg total) by mouth daily, Disp: 90 tablet, Rfl: 0    ibuprofen (MOTRIN) 600 mg tablet, Take 1 tablet (600 mg total) by mouth every 6 (six) hours (Patient not taking: Reported on 2025), Disp: 30 tablet, Rfl: 0    magnesium oxide-pyridoxine (BEELITH) 362-20 MG TABS, Take 1 tablet by mouth daily (Patient  "not taking: Reported on 4/21/2025), Disp: , Rfl:     NIFEdipine (ADALAT CC) 30 MG 24 hr tablet, Take 1 tablet (30 mg total) by mouth daily (Patient not taking: Reported on 4/23/2025), Disp: 30 tablet, Rfl: 0    oxyCODONE (Roxicodone) 5 immediate release tablet, Take 1 tablet (5 mg total) by mouth every 4 (four) hours as needed for moderate pain for up to 10 days Max Daily Amount: 30 mg (Patient not taking: Reported on 4/23/2025), Disp: 6 tablet, Rfl: 0      Review of Systems  Denies Fevers/chills/N/V/constipation/ excessive vaginal bleeding/excessive pain/dysuria/frequency of urination. Also as noted in HPI.      Objective     /66 (BP Location: Right arm, Patient Position: Sitting, Cuff Size: Standard)   Ht 5' 4\" (1.626 m)   Wt 63.5 kg (140 lb)   LMP 01/18/2024 (Exact Date)   Breastfeeding No   BMI 24.03 kg/m²     General: alert and oriented, in no acute distress  Abdomen: soft, non-tender  Incision: healing well, no drainage, no erythema, no hernia, no seroma, no swelling, no dehiscence, incision well approximated       "

## 2025-04-23 NOTE — TELEPHONE ENCOUNTER
Spoke with the patient and she is feeling better , she went for an appointment on Monday and was taken off of the BP medication, she has an incision check today in our office

## 2025-04-26 DIAGNOSIS — F41.1 GAD (GENERALIZED ANXIETY DISORDER): ICD-10-CM

## 2025-05-07 ENCOUNTER — OFFICE VISIT (OUTPATIENT)
Age: 35
End: 2025-05-07
Payer: COMMERCIAL

## 2025-05-07 VITALS — TEMPERATURE: 98 F | BODY MASS INDEX: 23.77 KG/M2 | WEIGHT: 138.5 LBS

## 2025-05-07 DIAGNOSIS — D18.01 CHERRY ANGIOMA: ICD-10-CM

## 2025-05-07 DIAGNOSIS — L81.4 LENTIGINES: ICD-10-CM

## 2025-05-07 DIAGNOSIS — D22.9 MULTIPLE MELANOCYTIC NEVI: Primary | ICD-10-CM

## 2025-05-07 PROCEDURE — 99203 OFFICE O/P NEW LOW 30 MIN: CPT | Performed by: REGISTERED NURSE

## 2025-05-07 NOTE — PROGRESS NOTES
"[facility] Note     Patient Name: Calli Blair  Encounter Date: 05/07/2025       Have you been cared for by a [facility] Dermatologist in the last 3 years and, if so, which description applies to you? NO. I am considered a \"new\" patient and must complete all patient intake questions. I am of child-bearing potential.     REVIEW OF SYSTEMS:  Have you recently had or currently have any of the following? Recent fever or chills? No  Any non-healing wound? No  Are you pregnant or planning to become pregnant? No  Are you currently or planning to be nursing or breast feeding? YES, Nursing and Breast feeding   PAST MEDICAL HISTORY:  Have you personally ever had or currently have any of the following?  If \"YES,\" then please provide more detail. Skin cancer (such as Melanoma, Basal Cell Carcinoma, Squamous Cell Carcinoma?  No  Tuberculosis, HIV/AIDS, Hepatitis B or C: No  Radiation Treatment No   HISTORY OF IMMUNOSUPPRESSION:   Do you have a history of any of the following:  Systemic Immunosuppression such as Diabetes, Biologic or Immunotherapy, Chemotherapy, Organ Transplantation, Bone Marrow Transplantation or Prednsione?  No    Answering \"YES\" requires the addition of the dotphrase \"IMMUNOSUPPRESSED\" as the first diagnosis of the patient's visit.   FAMILY HISTORY:  Any \"first degree relatives\" (parent, brother, sister, or child) with the following?    Skin Cancer, Pancreatic or Other Cancer? YES, Mother- Skin cancer (unknown type)   PATIENT EXPERIENCE:    Do you want the Dermatologist to perform a COMPLETE skin exam today including a clinical examination under the \"bra and underwear\" areas?  Yes  If necessary, do we have your permission to call and leave a detailed message on your Preferred Phone number that includes your specific medical information?  Yes      Allergies   Allergen Reactions    Amoxicillin Rash     Rash- in childhood      Current Outpatient Medications:     Cholecalciferol (Vitamin D3) " 75 MCG (3000 UT) TABS, Take by mouth, Disp: , Rfl:     Prenatal MV-Min-Fe Fum-FA-DHA (PRENATAL 1 PO), Take by mouth, Disp: , Rfl:     sertraline (ZOLOFT) 50 mg tablet, Take 1 tablet (50 mg total) by mouth daily, Disp: 90 tablet, Rfl: 1    acetaminophen (TYLENOL) 325 mg tablet, Take 2 tablets (650 mg total) by mouth every 4 (four) hours as needed for mild pain (Patient not taking: Reported on 5/7/2025), Disp: , Rfl:               Whom besides the patient is providing clinical information about today's encounter?   NO ADDITIONAL HISTORIAN (patient alone provided history)    Physical Exam and Assessment/Plan by Diagnosis:    MELANOCYTIC NEVI  - Relevant exam: Scattered over the trunk/extremities are homogenously pigmented brown macules and papules. ELM performed and without concerning findings. No outliers unless otherwise noted in today's note  - Exam and clinical history consistent with melanocytic nevi  - Counseled to return to clinic prior to scheduled appointment should any of these lesions change or should any new lesions of concern arise  - Counseled on use of sun protection daily. Reviewed latest FDA sunscreen guidelines, including use of broad spectrum (UVA and UVB blocking) sunscreen or sun protective clothing with SPF 30-50 every 2-3 hours and reapplied after exposure to water  - Follow up every other year    LENTIGINES  OTHER SKIN CHANGES DUE TO CHRONIC EXPOSURE TO NONIONIZING RADIATION  - Relevant exam: Over sun exposed areas of shoulders are brown macules. ELM performed and without concerning findings.  - Exam and clinical history consistent with lentigines.  - Counseled to return to clinic prior to scheduled appointment should any of these lesions change or should any new lesions of concern arise.  - Recommended use of sunscreen as above and below.    CHERRY ANGIOMAS  - Relevant exam: Scattered over the trunk/extremities are red papules  - Exam and clinical history consistent with cherry angiomas  -  Educated that these are benign      Scribe Attestation      I,:  Maricel Allen MA am acting as a scribe while in the presence of the attending physician.:       I,:  Chris Steele MD personally performed the services described in this documentation    as scribed in my presence.:

## 2025-05-28 ENCOUNTER — POSTPARTUM VISIT (OUTPATIENT)
Dept: OBGYN CLINIC | Facility: CLINIC | Age: 35
End: 2025-05-28

## 2025-05-28 VITALS
WEIGHT: 140 LBS | BODY MASS INDEX: 23.9 KG/M2 | SYSTOLIC BLOOD PRESSURE: 116 MMHG | HEIGHT: 64 IN | DIASTOLIC BLOOD PRESSURE: 70 MMHG

## 2025-05-28 DIAGNOSIS — O99.341 DEPRESSION AFFECTING PREGNANCY IN FIRST TRIMESTER, ANTEPARTUM: ICD-10-CM

## 2025-05-28 DIAGNOSIS — F32.A DEPRESSION AFFECTING PREGNANCY IN FIRST TRIMESTER, ANTEPARTUM: ICD-10-CM

## 2025-05-28 PROBLEM — Z98.891 S/P PRIMARY LOW TRANSVERSE C-SECTION: Status: RESOLVED | Noted: 2024-09-30 | Resolved: 2025-05-28

## 2025-05-28 PROBLEM — O13.9 GESTATIONAL HYPERTENSION: Status: RESOLVED | Noted: 2025-04-18 | Resolved: 2025-05-28

## 2025-05-28 PROBLEM — N93.9 VAGINAL BLEEDING: Status: RESOLVED | Noted: 2025-02-14 | Resolved: 2025-05-28

## 2025-05-28 PROBLEM — O99.280 THYROID DISEASE AFFECTING PREGNANCY: Status: RESOLVED | Noted: 2024-10-03 | Resolved: 2025-05-28

## 2025-05-28 PROBLEM — O09.813 PREGNANCY RESULTING FROM IN VITRO FERTILIZATION IN THIRD TRIMESTER: Status: RESOLVED | Noted: 2024-09-30 | Resolved: 2025-05-28

## 2025-05-28 PROBLEM — E07.9 THYROID DISEASE AFFECTING PREGNANCY: Status: RESOLVED | Noted: 2024-10-03 | Resolved: 2025-05-28

## 2025-05-28 PROCEDURE — 99024 POSTOP FOLLOW-UP VISIT: CPT | Performed by: OBSTETRICS & GYNECOLOGY

## 2025-05-28 NOTE — PROGRESS NOTES
Assessment & Plan     Normal postpartum exam.     1. Contraception: none.  2. Annual exam due in March.   3. Increase activity as tolerated, may resume all normal activity.  4. Lactation consult needed: yes; if yes, [facility] information discussed.         Subjective   Chief Complaint   Patient presents with    Postpartum Care       Calli Blair is a 34 y.o.  female who presents for a postpartum visit.     Delivery Method: , Low Transverse   Delivery Date and Time: 2025 8:30 AM  Delivery Attending: Sanjana Clancy  Gestational Age at delivery: 39w0d   Route of Delivery: , Low Transverse  Reason for  delivery: Breech      Admitting Diagnoses:   Problem List[1]    Gestational Diabetes: no  Pregnancy Complications: gestational HTN    Anesthesia: Spinal,     Delivery: , Low Transverse at 2025 8:30 AM    Baby's Name: Roshni  Baby's Weight: 3720 g (8 lb 3.2 oz); 131.22    Apgar scores: 8  and 9  at 1 and 5 minutes, respectively  Breast or formula: Breastfeeding & formula  Pediatrician: [facility]    Bleeding none. Bowel function: normal. Bladder function: normal. The patient is not having any pain.     Patient has not been sexually active. Desired contraception method is none.    Postpartum depression screening: negative. EPDS : 2. She denies depression/anxiety/trouble sleeping. She is on Zoloft 50mg     Last Pap : 3/2/23 ; no abnormalities      The following portions of the patient's history were reviewed and updated as appropriate: allergies, current medications, past family history, past medical history, past social history, past surgical history, and problem list.    Current Medications[2]    Allergies[3]    Review of Systems  Review of Systems   Constitutional:  Negative for chills and fever.   Respiratory:  Negative for shortness of breath.    Cardiovascular:  Negative for chest pain and leg swelling.   Gastrointestinal:  Negative for abdominal  "distention, abdominal pain, constipation, nausea and vomiting.   Genitourinary:  Negative for dysuria, frequency, urgency, vaginal bleeding and vaginal discharge.   Neurological:  Negative for headaches.         Objective      /70 (BP Location: Right arm, Patient Position: Sitting, Cuff Size: Standard)   Ht 5' 4\" (1.626 m)   Wt 63.5 kg (140 lb)   LMP 2024 (Exact Date)   Breastfeeding Yes   BMI 24.03 kg/m²     OBGyn Exam    Incision: clean, dry, and intact            [1]   Patient Active Problem List  Diagnosis    Subclinical hypothyroidism    S/P primary low transverse     Thyroid disease affecting pregnancy    Depression affecting pregnancy in first trimester, antepartum    Breech presentation, no version    Gestational hypertension   [2]   Current Outpatient Medications:     Cholecalciferol (Vitamin D3) 75 MCG (3000 UT) TABS, Take by mouth, Disp: , Rfl:     Prenatal MV-Min-Fe Fum-FA-DHA (PRENATAL 1 PO), Take by mouth, Disp: , Rfl:     sertraline (ZOLOFT) 50 mg tablet, Take 1 tablet (50 mg total) by mouth daily, Disp: 90 tablet, Rfl: 1    acetaminophen (TYLENOL) 325 mg tablet, Take 2 tablets (650 mg total) by mouth every 4 (four) hours as needed for mild pain (Patient not taking: Reported on 2025), Disp: , Rfl:   [3]   Allergies  Allergen Reactions    Amoxicillin Rash     Rash- in childhood     "

## 2025-05-28 NOTE — PATIENT INSTRUCTIONS
[email or web address]    [facility] has always been known for providing the highest level of maternal and  care through a compassionate and personalized approach. The most comprehensive program of its kind in the region, the [facility] provides mothers, fathers, support persons and family members with a variety of services commonly needed before, during and after pregnancy in one convenient location.    [facility] delivers personalized and compassionate care that includes:    Classes:  Prenatal and  classes  Support Groups  Our groups provide new moms with an opportunity to receive support from the [facility] team as well as support and socialization from other new moms  Lactation Support Services  Lactation services are provided by certified lactation specialists who are skilled at evaluating and addressing breastfeeding concerns  Post-Partum Emotional Wellness Services  [facility] Therapists help moms who are experiencing post-partum symptoms  Activities/Exercise  Fun and engaging activities for mom and baby  Onsite Play  A designated, play area is available for children    [facility] [address] [address]  [phone] ([phone])

## 2025-05-30 ENCOUNTER — NURSE TRIAGE (OUTPATIENT)
Dept: OTHER | Facility: OTHER | Age: 35
End: 2025-05-30

## 2025-05-30 DIAGNOSIS — N61.0 MASTITIS: Primary | ICD-10-CM

## 2025-05-30 DIAGNOSIS — R11.0 NAUSEA: Primary | ICD-10-CM

## 2025-05-30 RX ORDER — ONDANSETRON 4 MG/1
4 TABLET, FILM COATED ORAL EVERY 8 HOURS PRN
Qty: 20 TABLET | Refills: 0 | Status: SHIPPED | OUTPATIENT
Start: 2025-05-30

## 2025-05-30 RX ORDER — CEFUROXIME AXETIL 500 MG/1
500 TABLET ORAL EVERY 12 HOURS SCHEDULED
Qty: 14 TABLET | Refills: 0 | Status: SHIPPED | OUTPATIENT
Start: 2025-05-30 | End: 2025-06-06

## 2025-05-30 NOTE — TELEPHONE ENCOUNTER
"REASON FOR CONVERSATION: MASTITIS    SYMPTOMS: breast pain, chills    OTHER HEALTH INFORMATION: breastfeeding x 7 weeks    PROTOCOL DISPOSITION: See [facility] Within 4 Hours (Or PCP Triage)    CARE ADVICE PROVIDED: Advised to take tylenol and monitor    PRACTICE FOLLOW-UP: Please call back for appointment/follow up for possible mastitis       Reason for Disposition   [1] Breast pain or lump AND [2] mother has chills or feeling overall ill    Additional Information   [1] Breast symptoms (e.g., lump, pain, redness, swelling) AND [2] mother plans to continue breastfeeding or pumping    Answer Assessment - Initial Assessment Questions  1. SYMPTOM: \"What's the main symptom you're concerned about?\" (e.g., pain, redness, swelling)      Right breast pain     2. ONSET: \"When did the  pain  start?\"      About 2:30 this morning    3. LOCATION: \"Which breast?\" (e.g., left, right, both) \"Is the pain in the breast or just the nipple?\"      Right     4. PAIN: \"How bad is the pain?\"  (Scale 1-10; or mild, moderate, severe)      \"Feels like generally hard\"     5. REDNESS: \"Does the skin appear red? Does the breast feel hot to touch?\"       Denies     6. LUMP OR SWELLING: \"Does the breast feel hard or have lumps?\"      Feels generally swollen     7. DELIVERY DATE: \"When was your delivery date?\" \"Vaginal delivery or ?\"          8. BREASTFEEDING AND PUMPING: \"Did you breastfeed your baby or use a breast pump after delivery?\" If Yes, ask: \"When did you last breastfeed or pump your breasts?\"      Both     9. FEVER: \"Do you have a fever?\" If Yes, ask: \"What is it, how was it measured, and when did it start?\"      Chills, no fever but took tylenol     10. OTHER SYMPTOMS: \"Do you have any other symptoms?\" (e.g., feeling sad or depressed, nipple symptoms)        Vomiting    Protocols used: Postpartum - Breast Pain and Engorgement-Adult-, Breastfeeding - Mother's Breast Symptoms or Illness-Pediatric-    "

## 2025-05-30 NOTE — PROGRESS NOTES
Concern for mastitis -   Fever, right breast pain, swollen breast.     Ceftin sent - BID x 7 days.

## 2025-07-29 DIAGNOSIS — F41.1 GAD (GENERALIZED ANXIETY DISORDER): ICD-10-CM

## (undated) DEVICE — CHLORAPREP HI-LITE 26ML ORANGE

## (undated) DEVICE — Device

## (undated) DEVICE — GLOVE INDICATOR PI UNDERGLOVE SZ 7.5 BLUE

## (undated) DEVICE — GLOVE SRG BIOGEL ECLIPSE 7

## (undated) DEVICE — SUT STRATAFIX SPIRAL PDS PLUS 1 CTX 18 IN SXPP1A400

## (undated) DEVICE — SUT MONOCRYL 0 CTX 36 IN Y398H

## (undated) DEVICE — PACK C-SECTION PBDS

## (undated) DEVICE — SUT PLAIN 3-0 CT-1 27 IN 842H

## (undated) DEVICE — SKIN MARKER DUAL TIP WITH RULER CAP, FLEXIBLE RULER AND LABELS: Brand: DEVON

## (undated) DEVICE — SUT STRATAFIX SPIRAL 4-0 PGA/PCL 30 X 30 CM SXMD2B409